# Patient Record
Sex: MALE | Race: WHITE | NOT HISPANIC OR LATINO | URBAN - METROPOLITAN AREA
[De-identification: names, ages, dates, MRNs, and addresses within clinical notes are randomized per-mention and may not be internally consistent; named-entity substitution may affect disease eponyms.]

---

## 2017-12-11 ENCOUNTER — OUTPATIENT (OUTPATIENT)
Dept: OUTPATIENT SERVICES | Facility: HOSPITAL | Age: 52
LOS: 1 days | Discharge: HOME | End: 2017-12-11

## 2017-12-11 DIAGNOSIS — I20.9 ANGINA PECTORIS, UNSPECIFIED: ICD-10-CM

## 2018-02-09 ENCOUNTER — INPATIENT (INPATIENT)
Facility: HOSPITAL | Age: 53
LOS: 3 days | Discharge: HOME | End: 2018-02-13
Attending: HOSPITALIST

## 2018-02-09 VITALS
HEART RATE: 101 BPM | WEIGHT: 220.02 LBS | SYSTOLIC BLOOD PRESSURE: 138 MMHG | DIASTOLIC BLOOD PRESSURE: 75 MMHG | HEIGHT: 70 IN | TEMPERATURE: 98 F | RESPIRATION RATE: 18 BRPM

## 2018-02-09 DIAGNOSIS — Z98.890 OTHER SPECIFIED POSTPROCEDURAL STATES: Chronic | ICD-10-CM

## 2018-02-09 LAB
ALBUMIN SERPL ELPH-MCNC: 4.2 G/DL — SIGNIFICANT CHANGE UP (ref 3–5.5)
ALP SERPL-CCNC: 38 U/L — SIGNIFICANT CHANGE UP (ref 30–115)
ALT FLD-CCNC: 29 U/L — SIGNIFICANT CHANGE UP (ref 0–41)
ANION GAP SERPL CALC-SCNC: 8 MMOL/L — SIGNIFICANT CHANGE UP (ref 7–14)
APTT BLD: 27.7 SEC — SIGNIFICANT CHANGE UP (ref 27–39.2)
AST SERPL-CCNC: 40 U/L — SIGNIFICANT CHANGE UP (ref 0–41)
BASOPHILS # BLD AUTO: 0.01 K/UL — SIGNIFICANT CHANGE UP (ref 0–0.2)
BASOPHILS NFR BLD AUTO: 0.1 % — SIGNIFICANT CHANGE UP (ref 0–1)
BILIRUB SERPL-MCNC: 0.7 MG/DL — SIGNIFICANT CHANGE UP (ref 0.2–1.2)
BUN SERPL-MCNC: 15 MG/DL — SIGNIFICANT CHANGE UP (ref 10–20)
CALCIUM SERPL-MCNC: 9 MG/DL — SIGNIFICANT CHANGE UP (ref 8.5–10.1)
CHLORIDE SERPL-SCNC: 101 MMOL/L — SIGNIFICANT CHANGE UP (ref 98–110)
CO2 SERPL-SCNC: 27 MMOL/L — SIGNIFICANT CHANGE UP (ref 17–32)
CREAT SERPL-MCNC: 0.9 MG/DL — SIGNIFICANT CHANGE UP (ref 0.7–1.5)
EOSINOPHIL # BLD AUTO: 0.03 K/UL — SIGNIFICANT CHANGE UP (ref 0–0.7)
EOSINOPHIL NFR BLD AUTO: 0.3 % — SIGNIFICANT CHANGE UP (ref 0–8)
ERYTHROCYTE [SEDIMENTATION RATE] IN BLOOD: 58 MM/HR — HIGH (ref 0–10)
GLUCOSE SERPL-MCNC: 95 MG/DL — SIGNIFICANT CHANGE UP (ref 70–110)
HCT VFR BLD CALC: 37.9 % — LOW (ref 42–52)
HGB BLD-MCNC: 13.3 G/DL — LOW (ref 14–18)
IMM GRANULOCYTES NFR BLD AUTO: 0.4 % — HIGH (ref 0.1–0.3)
INR BLD: 1.15 RATIO — SIGNIFICANT CHANGE UP (ref 0.65–1.3)
LACTATE SERPL-SCNC: 0.7 MMOL/L — SIGNIFICANT CHANGE UP (ref 0.5–2.2)
LYMPHOCYTES # BLD AUTO: 0.74 K/UL — LOW (ref 1.2–3.4)
LYMPHOCYTES # BLD AUTO: 7 % — LOW (ref 20.5–51.1)
MCHC RBC-ENTMCNC: 32.5 PG — HIGH (ref 27–31)
MCHC RBC-ENTMCNC: 35.1 G/DL — SIGNIFICANT CHANGE UP (ref 32–37)
MCV RBC AUTO: 92.7 FL — SIGNIFICANT CHANGE UP (ref 80–94)
MONOCYTES # BLD AUTO: 0.88 K/UL — HIGH (ref 0.1–0.6)
MONOCYTES NFR BLD AUTO: 8.4 % — SIGNIFICANT CHANGE UP (ref 1.7–9.3)
NEUTROPHILS # BLD AUTO: 8.8 K/UL — HIGH (ref 1.4–6.5)
NEUTROPHILS NFR BLD AUTO: 83.8 % — HIGH (ref 42.2–75.2)
PLATELET # BLD AUTO: 164 K/UL — SIGNIFICANT CHANGE UP (ref 130–400)
POTASSIUM SERPL-MCNC: 4.3 MMOL/L — SIGNIFICANT CHANGE UP (ref 3.5–5)
POTASSIUM SERPL-SCNC: 4.3 MMOL/L — SIGNIFICANT CHANGE UP (ref 3.5–5)
PROT SERPL-MCNC: 7.2 G/DL — SIGNIFICANT CHANGE UP (ref 6–8)
PROTHROM AB SERPL-ACNC: 12.5 SEC — SIGNIFICANT CHANGE UP (ref 9.95–12.87)
RBC # BLD: 4.09 M/UL — LOW (ref 4.7–6.1)
RBC # FLD: 11.7 % — SIGNIFICANT CHANGE UP (ref 11.5–14.5)
SODIUM SERPL-SCNC: 136 MMOL/L — SIGNIFICANT CHANGE UP (ref 135–146)
WBC # BLD: 10.5 K/UL — SIGNIFICANT CHANGE UP (ref 4.8–10.8)
WBC # FLD AUTO: 10.5 K/UL — SIGNIFICANT CHANGE UP (ref 4.8–10.8)

## 2018-02-09 RX ORDER — AMPICILLIN SODIUM AND SULBACTAM SODIUM 250; 125 MG/ML; MG/ML
3 INJECTION, POWDER, FOR SUSPENSION INTRAMUSCULAR; INTRAVENOUS ONCE
Qty: 0 | Refills: 0 | Status: COMPLETED | OUTPATIENT
Start: 2018-02-09 | End: 2018-02-09

## 2018-02-09 RX ADMIN — AMPICILLIN SODIUM AND SULBACTAM SODIUM 200 GRAM(S): 250; 125 INJECTION, POWDER, FOR SUSPENSION INTRAMUSCULAR; INTRAVENOUS at 20:15

## 2018-02-09 NOTE — ED PROVIDER NOTE - ATTENDING CONTRIBUTION TO CARE
52 y.o. M comes in c/o left knee pain, swelling, redness for 3 days, getting worse. Pt denies trauma. No fever/chills. Denies any bites/scratches or lesions over the knee. No hip/ankle pain. Pt was seen by PMD (Rober Quigley) who sent pt to the ED to r/o septic joint. On exam, pt in NAD, AAOx3, head NC/AT, CN II-XII intact, lungs CTA B/L, CV S1S2 regular, abdomen soft/NT/ND/(+)BS, ext (+) pain/warmth/swelling to left patella/knee, (+) pain on flexion/extension of the knee, Valgus/varus intact, anterior/posterior drawer sign (-), calf NTND. Will do labs, XR, Abx and reevaluate.

## 2018-02-09 NOTE — ED PROVIDER NOTE - PROGRESS NOTE DETAILS
d/w ortho resident, who saw xray, not concerned for septic joint at this point, will admit for IV antibiotics and place routine consult, ortho will see tomorrow morning.

## 2018-02-09 NOTE — ED PROVIDER NOTE - PHYSICAL EXAMINATION
AOx4, Non toxic appearing, NAD. Skin  warm and dry, no acute rash. PERRLA/EOM, conjunctiva and sclera clear. MM moist, no nasal discharge.  Pharynx and TM's unremarkable. Neck supple nt, no meningeal signs. Heart RRR s1s2 nl, no rub/murmur. Lungs- BS equal, CTAB. Abdomen soft ntnd no r/g. Extremities- moves all, +equal distal pulses, sensation wnl, normal ROM.     Left knee tender and erythematous with central lesion, decreased rom.

## 2018-02-09 NOTE — ED PROVIDER NOTE - OBJECTIVE STATEMENT
53 yo M p/w L knee pain and swelling x 3 days worsening, limited rom due to pain, began with mild abrasion to front of knee, denies fever or chills.

## 2018-02-09 NOTE — ED PROVIDER NOTE - NS ED ROS FT
Pt denied fvr/chills, HA, visual changes, dizziness, light headedness, presyncope, LOC, CP, POWERS, PND, SOB, cough, hemoptysis, abd pain, n/v/d, changes in bowel or bladder habits, numbness, weakness,  The pt also denied recent travel outside of the country, recent illnesses, sick contacts, recent airplane trips or periods of immobility/bedbound.

## 2018-02-10 DIAGNOSIS — L03.116 CELLULITIS OF LEFT LOWER LIMB: ICD-10-CM

## 2018-02-10 LAB — TYPE + AB SCN PNL BLD: SIGNIFICANT CHANGE UP

## 2018-02-10 RX ORDER — ENOXAPARIN SODIUM 100 MG/ML
40 INJECTION SUBCUTANEOUS EVERY 24 HOURS
Qty: 0 | Refills: 0 | Status: DISCONTINUED | OUTPATIENT
Start: 2018-02-10 | End: 2018-02-13

## 2018-02-10 RX ORDER — IBUPROFEN 200 MG
600 TABLET ORAL
Qty: 0 | Refills: 0 | Status: DISCONTINUED | OUTPATIENT
Start: 2018-02-10 | End: 2018-02-13

## 2018-02-10 RX ORDER — AMPICILLIN SODIUM AND SULBACTAM SODIUM 250; 125 MG/ML; MG/ML
3 INJECTION, POWDER, FOR SUSPENSION INTRAMUSCULAR; INTRAVENOUS ONCE
Qty: 0 | Refills: 0 | Status: COMPLETED | OUTPATIENT
Start: 2018-02-10 | End: 2018-02-10

## 2018-02-10 RX ORDER — OXYCODONE HYDROCHLORIDE 5 MG/1
5 TABLET ORAL EVERY 4 HOURS
Qty: 0 | Refills: 0 | Status: DISCONTINUED | OUTPATIENT
Start: 2018-02-10 | End: 2018-02-13

## 2018-02-10 RX ORDER — AMPICILLIN SODIUM AND SULBACTAM SODIUM 250; 125 MG/ML; MG/ML
3 INJECTION, POWDER, FOR SUSPENSION INTRAMUSCULAR; INTRAVENOUS EVERY 6 HOURS
Qty: 0 | Refills: 0 | Status: DISCONTINUED | OUTPATIENT
Start: 2018-02-10 | End: 2018-02-13

## 2018-02-10 RX ORDER — AMPICILLIN SODIUM AND SULBACTAM SODIUM 250; 125 MG/ML; MG/ML
INJECTION, POWDER, FOR SUSPENSION INTRAMUSCULAR; INTRAVENOUS
Qty: 0 | Refills: 0 | Status: DISCONTINUED | OUTPATIENT
Start: 2018-02-10 | End: 2018-02-13

## 2018-02-10 RX ADMIN — AMPICILLIN SODIUM AND SULBACTAM SODIUM 200 GRAM(S): 250; 125 INJECTION, POWDER, FOR SUSPENSION INTRAMUSCULAR; INTRAVENOUS at 17:06

## 2018-02-10 RX ADMIN — OXYCODONE HYDROCHLORIDE 5 MILLIGRAM(S): 5 TABLET ORAL at 17:50

## 2018-02-10 RX ADMIN — Medication 300 MILLIGRAM(S): at 17:07

## 2018-02-10 RX ADMIN — AMPICILLIN SODIUM AND SULBACTAM SODIUM 200 GRAM(S): 250; 125 INJECTION, POWDER, FOR SUSPENSION INTRAMUSCULAR; INTRAVENOUS at 12:27

## 2018-02-10 RX ADMIN — OXYCODONE HYDROCHLORIDE 5 MILLIGRAM(S): 5 TABLET ORAL at 16:58

## 2018-02-10 RX ADMIN — ENOXAPARIN SODIUM 40 MILLIGRAM(S): 100 INJECTION SUBCUTANEOUS at 21:08

## 2018-02-10 RX ADMIN — Medication 300 MILLIGRAM(S): at 14:55

## 2018-02-10 RX ADMIN — OXYCODONE HYDROCHLORIDE 5 MILLIGRAM(S): 5 TABLET ORAL at 09:40

## 2018-02-10 RX ADMIN — OXYCODONE HYDROCHLORIDE 5 MILLIGRAM(S): 5 TABLET ORAL at 10:20

## 2018-02-10 NOTE — CONSULT NOTE ADULT - ASSESSMENT
Full consult to follow Patient is a 52y old  Male who presents to the ER for evaluation of worsening left knee pain, redness and swelling. He does not recall any injury or trauma to the knee. He has no fever or chills. The Xray of Left knee shows no joint effusion but soft tissue swelling. He has evaluated by Ortho team, and started on Unasyn and Clindamycin. The ID consult requested to assist with further evaluation and antibiotic management.     # Left Knee Cellulitis    Would recommend:  1. Obtain Blood cultures X 2  2. Continue Unasyn  and  change Clindamycin to Vancomycin  3. Monitor and keep Vancomycin level between 15 to 20  4. Obtain HIV test  5. Continue pain management    d/w Patient    will follow the patient with you and make further recommendation based on the clinical course and Lab results  Thank you for the opportunity to participate in Mr. HOWELL's care

## 2018-02-10 NOTE — H&P ADULT - NSHPLABSRESULTS_GEN_ALL_CORE
CBC Full  -  ( 09 Feb 2018 20:54 )  WBC Count : 10.50 K/uL  Hemoglobin : 13.3 g/dL  Hematocrit : 37.9 %  Platelet Count - Automated : 164 K/uL  Mean Cell Volume : 92.7 fL  Mean Cell Hemoglobin : 32.5 pg  Mean Cell Hemoglobin Concentration : 35.1 g/dL  Auto Neutrophil # : 8.80 K/uL  Auto Lymphocyte # : 0.74 K/uL  Auto Monocyte # : 0.88 K/uL  Auto Eosinophil # : 0.03 K/uL  Auto Basophil # : 0.01 K/uL  Auto Neutrophil % : 83.8 %  Auto Lymphocyte % : 7.0 %  Auto Monocyte % : 8.4 %  Auto Eosinophil % : 0.3 %  Auto Basophil % : 0.1 %  02-09    136  |  101  |  15  ----------------------------<  95  4.3   |  27  |  0.9    Ca    9.0      09 Feb 2018 20:54    TPro  7.2  /  Alb  4.2  /  TBili  0.7  /  DBili  x   /  AST  40  /  ALT  29  /  AlkPhos  38  02-09

## 2018-02-10 NOTE — H&P ADULT - NSHPPHYSICALEXAM_GEN_ALL_CORE
unremarkable physsical except for redness with small ulceration over left knee. There is some limitation of motion to that joint

## 2018-02-10 NOTE — CONSULT NOTE ADULT - ASSESSMENT
Impression:  Cellulitis Left knee / probable infected left pre-patellar bursa / R/O left knee joint infection.

## 2018-02-10 NOTE — CONSULT NOTE ADULT - SUBJECTIVE AND OBJECTIVE BOX
Patient is a 52y old  Male who presents with a chief complaint of left knee problem (10 Feb 2018 04:29)      INTERVAL HPI/OVERNIGHT EVENTS:  T(C): 37.2 (02-10-18 @ 15:00), Max: 37.2 (02-10-18 @ 15:00)  HR: 86 (02-10-18 @ 15:00) (86 - 94)  BP: 120/60 (02-10-18 @ 15:00) (120/60 - 146/73)  RR: 16 (02-10-18 @ 15:00) (16 - 20)  SpO2: 98% (02-10-18 @ 02:02) (98% - 98%)  Wt(kg): --  I&O's Summary      PAST MEDICAL & SURGICAL HISTORY:  No pertinent past medical history  H/O shoulder surgery      SOCIAL HISTORY  Alcohol:  Tobacco:  Illicit substance use:      FAMILY HISTORY:      LABS:                        13.3   10.50 )-----------( 164      ( 09 Feb 2018 20:54 )             37.9     02-09    136  |  101  |  15  ----------------------------<  95  4.3   |  27  |  0.9    Ca    9.0      09 Feb 2018 20:54    TPro  7.2  /  Alb  4.2  /  TBili  0.7  /  DBili  x   /  AST  40  /  ALT  29  /  AlkPhos  38  02-09    PT/INR - ( 09 Feb 2018 20:54 )   PT: 12.50 sec;   INR: 1.15 ratio         PTT - ( 09 Feb 2018 20:54 )  PTT:27.7 sec    CAPILLARY BLOOD GLUCOSE                MEDICATIONS  (STANDING):  ampicillin/sulbactam  IVPB      ampicillin/sulbactam  IVPB 3 Gram(s) IV Intermittent every 6 hours  clindamycin   Capsule 300 milliGRAM(s) Oral four times a day  enoxaparin Injectable 40 milliGRAM(s) SubCutaneous every 24 hours    MEDICATIONS  (PRN):  ibuprofen  Tablet 600 milliGRAM(s) Oral four times a day PRN pain  oxyCODONE    IR 5 milliGRAM(s) Oral every 4 hours PRN Moderate Pain (4 - 6)      REVIEW OF SYSTEMS:  CONSTITUTIONAL: No fever, weight loss, or fatigue  EYES: No eye pain, visual disturbances, or discharge  ENMT:  No difficulty hearing, tinnitus, vertigo; No sinus or throat pain  NECK: No pain or stiffness  RESPIRATORY: No cough, wheezing, chills or hemoptysis; No shortness of breath  CARDIOVASCULAR: No chest pain, palpitations, dizziness, or leg swelling  GASTROINTESTINAL: No abdominal or epigastric pain. No nausea, vomiting, or hematemesis; No diarrhea or constipation. No melena or hematochezia.  GENITOURINARY: No dysuria, frequency, hematuria, or incontinence  NEUROLOGICAL: No headaches, memory loss, loss of strength, numbness, or tremors  SKIN: No itching, burning, rashes, or lesions   LYMPH NODES: No enlarged glands  ENDOCRINE: No heat or cold intolerance; No hair loss  MUSCULOSKELETAL: No joint pain or swelling; No muscle, back, or extremity pain  PSYCHIATRIC: No depression, anxiety, mood swings, or difficulty sleeping  HEME/LYMPH: No easy bruising, or bleeding gums  ALLERY AND IMMUNOLOGIC: No hives or eczema    RADIOLOGY & ADDITIONAL TESTS:    Imaging Personally Reviewed:  [ ] YES  [ ] NO    Consultant(s) Notes Reviewed:  [ ] YES  [ ] NO    PHYSICAL EXAM:  GENERAL: NAD, well-groomed, well-developed  HEAD:  Atraumatic, Normocephalic  EYES: EOMI, PERRLA, conjunctiva and sclera clear  ENMT: No tonsillar erythema, exudates, or enlargement; Moist mucous membranes, Good dentition, No lesions  NECK: Supple, No JVD, Normal thyroid  NERVOUS SYSTEM:  Alert & Oriented X3, Good concentration; Motor Strength 5/5 B/L upper and lower extremities; DTRs 2+ intact and symmetric  CHEST/LUNG: Clear to percussion bilaterally; No rales, rhonchi, wheezing, or rubs  HEART: Regular rate and rhythm; No murmurs, rubs, or gallops  ABDOMEN: Soft, Nontender, Nondistended; Bowel sounds present  EXTREMITIES:  2+ Peripheral Pulses, No clubbing, cyanosis, or edema  LYMPH: No lymphadenopathy noted  SKIN: No rashes or lesions    Care Discussed with Consultants/Other Providers [ ] YES  [ ] NO Patient is a 52y old  Male who presents to the ER for evaluation of worsening left knee pain, redness and swelling. He does not recall any injury or trauma to the knee. He has no fever or chills. The Xray of Left knee shows no joint effusion but soft tissue swelling. He has evaluated by Ortho team, and started on Unasyn and Clindamycin. The ID consult requested to assist with further evaluation and antibiotic management.           REVIEW OF SYSTEMS: Total of twelve systems have been reviewed with patient and found to be negative unless mentioned in HPI          PAST MEDICAL & SURGICAL HISTORY:  No pertinent past medical history  H/O shoulder surgery        SOCIAL HISTORY  Alcohol: Does not drink  Tobacco: does not smoke  Illicit substance use: None        FAMILY HISTORY: Non contributory to the present illness          ALLERGIES: NKDA        T(C): 37.2 (02-10-18 @ 15:00), Max: 37.2 (02-10-18 @ 15:00)  HR: 86 (02-10-18 @ 15:00) (86 - 94)  BP: 120/60 (02-10-18 @ 15:00) (120/60 - 146/73)  RR: 16 (02-10-18 @ 15:00) (16 - 20)  SpO2: 98% (02-10-18 @ 02:02) (98% - 98%)  Wt(kg): --  I&O's Summary        PHYSICAL EXAM:  GENERAL: Not in distress  CVS: s1 and s2 present  RESP: Air entry B/L  GI: abdomen soft and nontender  EXT: Left Knee red, swollen, warm to touch and tender  CNS: AAOX3          LABS:                        13.3   10.50 )-----------( 164      ( 09 Feb 2018 20:54 )             37.9         02-09    136  |  101  |  15  ----------------------------<  95  4.3   |  27  |  0.9    Ca    9.0      09 Feb 2018 20:54    TPro  7.2  /  Alb  4.2  /  TBili  0.7  /  DBili  x   /  AST  40  /  ALT  29  /  AlkPhos  38  02-09    PT/INR - ( 09 Feb 2018 20:54 )   PT: 12.50 sec;   INR: 1.15 ratio         PTT - ( 09 Feb 2018 20:54 )  PTT:27.7 sec    CAPILLARY BLOOD GLUCOSE              MEDICATIONS  (STANDING):  ampicillin/sulbactam  IVPB      ampicillin/sulbactam  IVPB 3 Gram(s) IV Intermittent every 6 hours  clindamycin   Capsule 300 milliGRAM(s) Oral four times a day  enoxaparin Injectable 40 milliGRAM(s) SubCutaneous every 24 hours    MEDICATIONS  (PRN):  ibuprofen  Tablet 600 milliGRAM(s) Oral four times a day PRN pain  oxyCODONE    IR 5 milliGRAM(s) Oral every 4 hours PRN Moderate Pain (4 - 6)          RADIOLOGY & ADDITIONAL TESTS:    < from: Xray Knee 1 or 2 Views, Left (02.09.18 @ 19:46) >  impression: Focal prepatellar soft tissue swelling is present with tendon   thickening with  chronically fragmented patellar enthesopathy,. Findings   are compatible with acute on chronic jumpers knee.    There are also rounded ossicles at the distal quadriceps compatible   chronic tendinosis with prior avulsion/enthesopathy.     No acute displaced fracture or joint malalignment present. There is mild   tricompartmental joint space narrowing with osteophytes. No suprapatellar   joint effusion present.            < end of copied text >

## 2018-02-10 NOTE — H&P ADULT - HISTORY OF PRESENT ILLNESS
52 y.o. male presents to the er due to pain swelling and redness to left knee for 2 days but worse since last night

## 2018-02-10 NOTE — CONSULT NOTE ADULT - PROBLEM SELECTOR RECOMMENDATION 9
* Case d/w Dr. Zavala and wants:    - MRI of left knee to r/o deep collection or intra-articular collection / septic knee. (Patient denies any metal or foreign objects in body).    - ID consult and F/U.  - Ivabx.  - Pain medications PRN.  - Once MRI left knee completed and resulted , will either I&D if superficial vs refer back to Orthopaedics if shows intra-articular collection/septic knee.  - Dr. Zavala to follow.

## 2018-02-10 NOTE — CONSULT NOTE ADULT - SUBJECTIVE AND OBJECTIVE BOX
male with redness on knee cap 3 days no fever or chills ,  improved on iv antibiotics    3x3 cm redness ,no abscess palpated, no knee effusion good rom stable    xray some calcification in quad and anterior to patella

## 2018-02-10 NOTE — CONSULT NOTE ADULT - SUBJECTIVE AND OBJECTIVE BOX
53 y/o male with c/o redness, swelling and moderate to severe pain on left knee cap for 3 days and becoming progressively worse so came to ER for evaluation.  Patient denies any fever or chills.  Patient able to ambulate and bend his knee but with significant pain. Patient denies injury to his knee but noted a small lesion on anterior knee that may have caused this problem.  Patient reports improved on iv antibiotics since in hospital.      PAST MEDICAL & SURGICAL HISTORY:  No pertinent past medical history  H/O shoulder surgery      Allergies:  No Known Allergies or   Intolerances    Medications:  Denies using medications.    Social hx.:  Smokes cigars, denies Etoh / Drug use.      Vital Signs Last 24 Hrs  T(C): 36.9 (10 Feb 2018 07:50), Max: 36.9 (10 Feb 2018 07:50)  T(F): 98.4 (10 Feb 2018 07:50), Max: 98.4 (10 Feb 2018 07:50)  HR: 94 (10 Feb 2018 07:50) (88 - 101)  BP: 125/59 (10 Feb 2018 07:50) (123/72 - 146/73)  BP(mean): --  RR: 16 (10 Feb 2018 07:50) (16 - 20)  SpO2: 98% (10 Feb 2018 02:02) (98% - 98%)        PHYSICAL EXAM:    Constitutional: A&Ox4    Eyes:PERRLA, EOM intact    Respiratory:cta b/l    Cardiovascular: s1 s2 rrr    Gastrointestinal: soft nt  nd + bs no rebound or guarding    Genitourinary:no cva tenderenss    Extremities:   decreased ROM limited by pain and swelling, + 3 x 3 area of edema,  warmth, induration erythema to anterior knee, pre-patella area,  tenderness to palpation, no palpable knee joint effusion, +5/+5 foot/ankle DF and PF, + 2/+2 pedal pulses, sensation intact distally, no palpable cords, absent Wilber's sign.            LABS:                13.3   10.50 )-----------( 164      ( 09 Feb 2018 20:54 )             37.9         02-09    136  |  101  |  15  ----------------------------<  95  4.3   |  27  |  0.9    Ca    9.0      09 Feb 2018 20:54    TPro  7.2  /  Alb  4.2  /  TBili  0.7  /  DBili  x   /  AST  40  /  ALT  29  /  AlkPhos  38  02-09        XRAY Left knee:      Findings/  impression: Focal prepatellar soft tissue swelling is present with tendon   thickening with  chronically fragmented patellar enthesopathy,. Findings   are compatible with acute on chronic jumpers knee.    There are also rounded ossicles at the distal quadriceps compatible   chronic tendinosis with prior avulsion/enthesopathy.     No acute displaced fracture or joint malalignment present. There is mild   tricompartmental joint space narrowing with osteophytes. No suprapatellar   joint effusion present.

## 2018-02-11 LAB
ALBUMIN SERPL ELPH-MCNC: 3.4 G/DL — SIGNIFICANT CHANGE UP (ref 3–5.5)
ALP SERPL-CCNC: 32 U/L — SIGNIFICANT CHANGE UP (ref 30–115)
ALT FLD-CCNC: 26 U/L — SIGNIFICANT CHANGE UP (ref 0–41)
ANION GAP SERPL CALC-SCNC: 7 MMOL/L — SIGNIFICANT CHANGE UP (ref 7–14)
AST SERPL-CCNC: 26 U/L — SIGNIFICANT CHANGE UP (ref 0–41)
BILIRUB SERPL-MCNC: 0.6 MG/DL — SIGNIFICANT CHANGE UP (ref 0.2–1.2)
BUN SERPL-MCNC: 17 MG/DL — SIGNIFICANT CHANGE UP (ref 10–20)
CALCIUM SERPL-MCNC: 8.7 MG/DL — SIGNIFICANT CHANGE UP (ref 8.5–10.1)
CHLORIDE SERPL-SCNC: 104 MMOL/L — SIGNIFICANT CHANGE UP (ref 98–110)
CO2 SERPL-SCNC: 26 MMOL/L — SIGNIFICANT CHANGE UP (ref 17–32)
CREAT SERPL-MCNC: 0.7 MG/DL — SIGNIFICANT CHANGE UP (ref 0.7–1.5)
CRP SERPL-MCNC: 3.6 MG/DL — HIGH (ref 0–0.4)
GLUCOSE SERPL-MCNC: 104 MG/DL — SIGNIFICANT CHANGE UP (ref 70–110)
HCT VFR BLD CALC: 34.8 % — LOW (ref 42–52)
HGB BLD-MCNC: 12.3 G/DL — LOW (ref 14–18)
MAGNESIUM SERPL-MCNC: 2.2 MG/DL — SIGNIFICANT CHANGE UP (ref 1.8–2.4)
MCHC RBC-ENTMCNC: 32.7 PG — HIGH (ref 27–31)
MCHC RBC-ENTMCNC: 35.3 G/DL — SIGNIFICANT CHANGE UP (ref 32–37)
MCV RBC AUTO: 92.6 FL — SIGNIFICANT CHANGE UP (ref 80–94)
NRBC # BLD: 0 /100 WBCS — SIGNIFICANT CHANGE UP (ref 0–0)
PLATELET # BLD AUTO: 159 K/UL — SIGNIFICANT CHANGE UP (ref 130–400)
POTASSIUM SERPL-MCNC: 4.6 MMOL/L — SIGNIFICANT CHANGE UP (ref 3.5–5)
POTASSIUM SERPL-SCNC: 4.6 MMOL/L — SIGNIFICANT CHANGE UP (ref 3.5–5)
PROT SERPL-MCNC: 6.1 G/DL — SIGNIFICANT CHANGE UP (ref 6–8)
RBC # BLD: 3.76 M/UL — LOW (ref 4.7–6.1)
RBC # FLD: 11.7 % — SIGNIFICANT CHANGE UP (ref 11.5–14.5)
SODIUM SERPL-SCNC: 137 MMOL/L — SIGNIFICANT CHANGE UP (ref 135–146)
WBC # BLD: 5.95 K/UL — SIGNIFICANT CHANGE UP (ref 4.8–10.8)
WBC # FLD AUTO: 5.95 K/UL — SIGNIFICANT CHANGE UP (ref 4.8–10.8)

## 2018-02-11 RX ORDER — VANCOMYCIN HCL 1 G
1250 VIAL (EA) INTRAVENOUS ONCE
Qty: 0 | Refills: 0 | Status: COMPLETED | OUTPATIENT
Start: 2018-02-11 | End: 2018-02-11

## 2018-02-11 RX ORDER — VANCOMYCIN HCL 1 G
1250 VIAL (EA) INTRAVENOUS EVERY 12 HOURS
Qty: 0 | Refills: 0 | Status: DISCONTINUED | OUTPATIENT
Start: 2018-02-11 | End: 2018-02-13

## 2018-02-11 RX ORDER — PANTOPRAZOLE SODIUM 20 MG/1
40 TABLET, DELAYED RELEASE ORAL
Qty: 0 | Refills: 0 | Status: DISCONTINUED | OUTPATIENT
Start: 2018-02-11 | End: 2018-02-13

## 2018-02-11 RX ORDER — VANCOMYCIN HCL 1 G
VIAL (EA) INTRAVENOUS
Qty: 0 | Refills: 0 | Status: DISCONTINUED | OUTPATIENT
Start: 2018-02-11 | End: 2018-02-13

## 2018-02-11 RX ORDER — ONDANSETRON 8 MG/1
4 TABLET, FILM COATED ORAL ONCE
Qty: 0 | Refills: 0 | Status: COMPLETED | OUTPATIENT
Start: 2018-02-11 | End: 2018-02-11

## 2018-02-11 RX ADMIN — ONDANSETRON 4 MILLIGRAM(S): 8 TABLET, FILM COATED ORAL at 15:53

## 2018-02-11 RX ADMIN — Medication 300 MILLIGRAM(S): at 05:15

## 2018-02-11 RX ADMIN — AMPICILLIN SODIUM AND SULBACTAM SODIUM 200 GRAM(S): 250; 125 INJECTION, POWDER, FOR SUSPENSION INTRAMUSCULAR; INTRAVENOUS at 23:06

## 2018-02-11 RX ADMIN — Medication 166.67 MILLIGRAM(S): at 17:41

## 2018-02-11 RX ADMIN — AMPICILLIN SODIUM AND SULBACTAM SODIUM 200 GRAM(S): 250; 125 INJECTION, POWDER, FOR SUSPENSION INTRAMUSCULAR; INTRAVENOUS at 05:19

## 2018-02-11 RX ADMIN — AMPICILLIN SODIUM AND SULBACTAM SODIUM 200 GRAM(S): 250; 125 INJECTION, POWDER, FOR SUSPENSION INTRAMUSCULAR; INTRAVENOUS at 05:14

## 2018-02-11 RX ADMIN — AMPICILLIN SODIUM AND SULBACTAM SODIUM 200 GRAM(S): 250; 125 INJECTION, POWDER, FOR SUSPENSION INTRAMUSCULAR; INTRAVENOUS at 17:41

## 2018-02-11 RX ADMIN — Medication 300 MILLIGRAM(S): at 05:18

## 2018-02-11 RX ADMIN — ENOXAPARIN SODIUM 40 MILLIGRAM(S): 100 INJECTION SUBCUTANEOUS at 21:16

## 2018-02-11 NOTE — PROGRESS NOTE ADULT - PROBLEM SELECTOR PLAN 1
continue with antibioitics, appreciate surgery and id eval, mri today, further plan pending mri results

## 2018-02-11 NOTE — CHART NOTE - NSCHARTNOTEFT_GEN_A_CORE
Patient seen and examined.  Reports feels well, pain and swelling improving with abx.,  denies fever, chills, tremors.  Patient had his MRI left knee today.      ICU Vital Signs Last 24 Hrs  T(C): 36.2 (11 Feb 2018 07:43), Max: 37 (10 Feb 2018 23:32)  T(F): 97.1 (11 Feb 2018 07:43), Max: 98.6 (10 Feb 2018 23:32)  HR: 77 (11 Feb 2018 07:43) (77 - 79)  BP: 116/61 (11 Feb 2018 07:43) (116/61 - 134/63)  BP(mean): --  ABP: --  ABP(mean): --  RR: 16 (11 Feb 2018 07:43) (16 - 16)  SpO2: --        Physical exam:      General: conversant in NAD.  LLE:  + edema and induration to left anterior knee, but appears slightly improved since yesterday.  TTP to anterior knee, decreased ROM left knee secondary to pain and edema.  +foot/ankle DF/PF, + pedal pulses,  sensation wnl's, no palpable cords, absent Wilber's sign.       Labs:                     12.3   5.95  )-----------( 159      ( 11 Feb 2018 07:08 )             34.8     02-11    137  |  104  |  17  ----------------------------<  104  4.6   |  26  |  0.7    Ca    8.7      11 Feb 2018 07:08  Mg     2.2     02-11    TPro  6.1  /  Alb  3.4  /  TBili  0.6  /  DBili  x   /  AST  26  /  ALT  26  /  AlkPhos  32  02-11        MRI Left knee:      Impression:  1. Prepatellar adventitial bursitis with surrounding inflammation  2. 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 Subacute on chronic quadriceps insertional partial tear, high-grade   involving the deep and medial fibers  4. Origin patellar tendon subacute on chronic non-retracted tear involving   less than 50% of fibers  5. Medial meniscal flap tear from body to posterior root  6. Moderate medial compartment osteoarthritis with stress related edema   at periphery of medial tibial plateau  7. Patellar apical median ridge high-grade focal chondral defect with   patchy subchondral edema and degenerative change          Imp:     Left knee Pre-patellar bursitis / cellulitis.    Plan:     - Ivabx. --> Unasyn & Vanco. / ID follow-up.  - Pain medications PRN.  - Dr. Zavala to follow--> will discuss MRI findings with Dr. Zavala and he will decide if needs aspiration or I&D of left knee bursa.

## 2018-02-12 DIAGNOSIS — S83.8X9A SPRAIN OF OTHER SPECIFIED PARTS OF UNSPECIFIED KNEE, INITIAL ENCOUNTER: ICD-10-CM

## 2018-02-12 DIAGNOSIS — M19.90 UNSPECIFIED OSTEOARTHRITIS, UNSPECIFIED SITE: ICD-10-CM

## 2018-02-12 DIAGNOSIS — M70.51 OTHER BURSITIS OF KNEE, RIGHT KNEE: ICD-10-CM

## 2018-02-12 LAB
ANION GAP SERPL CALC-SCNC: 6 MMOL/L — LOW (ref 7–14)
BUN SERPL-MCNC: 12 MG/DL — SIGNIFICANT CHANGE UP (ref 10–20)
CALCIUM SERPL-MCNC: 8.7 MG/DL — SIGNIFICANT CHANGE UP (ref 8.5–10.1)
CHLORIDE SERPL-SCNC: 101 MMOL/L — SIGNIFICANT CHANGE UP (ref 98–110)
CO2 SERPL-SCNC: 31 MMOL/L — SIGNIFICANT CHANGE UP (ref 17–32)
CREAT SERPL-MCNC: 0.7 MG/DL — SIGNIFICANT CHANGE UP (ref 0.7–1.5)
GLUCOSE SERPL-MCNC: 125 MG/DL — HIGH (ref 70–110)
HCT VFR BLD CALC: 37 % — LOW (ref 42–52)
HGB BLD-MCNC: 12.8 G/DL — LOW (ref 14–18)
MCHC RBC-ENTMCNC: 32.5 PG — HIGH (ref 27–31)
MCHC RBC-ENTMCNC: 34.6 G/DL — SIGNIFICANT CHANGE UP (ref 32–37)
MCV RBC AUTO: 93.9 FL — SIGNIFICANT CHANGE UP (ref 80–94)
NRBC # BLD: 0 /100 WBCS — SIGNIFICANT CHANGE UP (ref 0–0)
PLATELET # BLD AUTO: 175 K/UL — SIGNIFICANT CHANGE UP (ref 130–400)
POTASSIUM SERPL-MCNC: 4.1 MMOL/L — SIGNIFICANT CHANGE UP (ref 3.5–5)
POTASSIUM SERPL-SCNC: 4.1 MMOL/L — SIGNIFICANT CHANGE UP (ref 3.5–5)
RBC # BLD: 3.94 M/UL — LOW (ref 4.7–6.1)
RBC # FLD: 11.5 % — SIGNIFICANT CHANGE UP (ref 11.5–14.5)
SODIUM SERPL-SCNC: 138 MMOL/L — SIGNIFICANT CHANGE UP (ref 135–146)
WBC # BLD: 4.29 K/UL — LOW (ref 4.8–10.8)
WBC # FLD AUTO: 4.29 K/UL — LOW (ref 4.8–10.8)

## 2018-02-12 RX ADMIN — Medication 166.67 MILLIGRAM(S): at 05:37

## 2018-02-12 RX ADMIN — AMPICILLIN SODIUM AND SULBACTAM SODIUM 200 GRAM(S): 250; 125 INJECTION, POWDER, FOR SUSPENSION INTRAMUSCULAR; INTRAVENOUS at 05:37

## 2018-02-12 RX ADMIN — AMPICILLIN SODIUM AND SULBACTAM SODIUM 200 GRAM(S): 250; 125 INJECTION, POWDER, FOR SUSPENSION INTRAMUSCULAR; INTRAVENOUS at 11:24

## 2018-02-12 RX ADMIN — Medication 166.67 MILLIGRAM(S): at 18:33

## 2018-02-12 RX ADMIN — ENOXAPARIN SODIUM 40 MILLIGRAM(S): 100 INJECTION SUBCUTANEOUS at 21:39

## 2018-02-12 RX ADMIN — PANTOPRAZOLE SODIUM 40 MILLIGRAM(S): 20 TABLET, DELAYED RELEASE ORAL at 11:23

## 2018-02-12 RX ADMIN — AMPICILLIN SODIUM AND SULBACTAM SODIUM 200 GRAM(S): 250; 125 INJECTION, POWDER, FOR SUSPENSION INTRAMUSCULAR; INTRAVENOUS at 18:33

## 2018-02-12 NOTE — PROGRESS NOTE ADULT - ASSESSMENT
52 yr old male with left knee pain/swelling - cellulitis/bursitis, improving    1. bedside wound culture obtained by Dr. Zavala - f/u wound culture; continue 1/4 inch packing x 24 hrs then F/U 52 yr old male with left knee pain/swelling - cellulitis/bursitis, improving    1. bedside wound culture obtained by Dr. Zavala - f/u wound culture; continue 1/4 inch packing x 24 hrs then F/U in office on THursday 2/15/18

## 2018-02-12 NOTE — CONSULT NOTE ADULT - SUBJECTIVE AND OBJECTIVE BOX
50 y/o w m  ret nypd  active in exercise  with onset of pain swelling left knee   may have scratched  it  hx  multiple shoulder operations   nkda  meds: oxycodone  was I and D 'ed today  packed feeling better    PE   good rom  no extensor lag     N/V intact     afebrile    WBC  10.5      mild erythema  calf soft   neg Homans sign    has had MRI knee in past   with "damage"    xrays   calcifications in patella and quad tendons  MRI  knee   chronic changes to extensor mechanism   medical meniscus tear   prepatellar bursitis    Imp: prepatellar bursitis   I and D'ed   antibiotics   local care           No orthopedic intervention    ok to D/C from ortho standpoint  WBAT

## 2018-02-13 ENCOUNTER — TRANSCRIPTION ENCOUNTER (OUTPATIENT)
Age: 53
End: 2018-02-13

## 2018-02-13 VITALS
DIASTOLIC BLOOD PRESSURE: 60 MMHG | HEART RATE: 87 BPM | RESPIRATION RATE: 16 BRPM | SYSTOLIC BLOOD PRESSURE: 120 MMHG | TEMPERATURE: 97 F

## 2018-02-13 DIAGNOSIS — M70.52 OTHER BURSITIS OF KNEE, LEFT KNEE: ICD-10-CM

## 2018-02-13 LAB
ALBUMIN SERPL ELPH-MCNC: 3.1 G/DL — SIGNIFICANT CHANGE UP (ref 3–5.5)
ALP SERPL-CCNC: 29 U/L — LOW (ref 30–115)
ALT FLD-CCNC: 26 U/L — SIGNIFICANT CHANGE UP (ref 0–41)
ANION GAP SERPL CALC-SCNC: 4 MMOL/L — LOW (ref 7–14)
AST SERPL-CCNC: 28 U/L — SIGNIFICANT CHANGE UP (ref 0–41)
BILIRUB SERPL-MCNC: 0.6 MG/DL — SIGNIFICANT CHANGE UP (ref 0.2–1.2)
BUN SERPL-MCNC: 12 MG/DL — SIGNIFICANT CHANGE UP (ref 10–20)
CALCIUM SERPL-MCNC: 8.5 MG/DL — SIGNIFICANT CHANGE UP (ref 8.5–10.1)
CHLORIDE SERPL-SCNC: 103 MMOL/L — SIGNIFICANT CHANGE UP (ref 98–110)
CO2 SERPL-SCNC: 30 MMOL/L — SIGNIFICANT CHANGE UP (ref 17–32)
CREAT SERPL-MCNC: 0.7 MG/DL — SIGNIFICANT CHANGE UP (ref 0.7–1.5)
GLUCOSE SERPL-MCNC: 122 MG/DL — HIGH (ref 70–110)
HCT VFR BLD CALC: 34.8 % — LOW (ref 42–52)
HGB BLD-MCNC: 12.2 G/DL — LOW (ref 14–18)
MAGNESIUM SERPL-MCNC: 2.1 MG/DL — SIGNIFICANT CHANGE UP (ref 1.8–2.4)
MCHC RBC-ENTMCNC: 32.8 PG — HIGH (ref 27–31)
MCHC RBC-ENTMCNC: 35.1 G/DL — SIGNIFICANT CHANGE UP (ref 32–37)
MCV RBC AUTO: 93.5 FL — SIGNIFICANT CHANGE UP (ref 80–94)
NRBC # BLD: 0 /100 WBCS — SIGNIFICANT CHANGE UP (ref 0–0)
PLATELET # BLD AUTO: 171 K/UL — SIGNIFICANT CHANGE UP (ref 130–400)
POTASSIUM SERPL-MCNC: 3.9 MMOL/L — SIGNIFICANT CHANGE UP (ref 3.5–5)
POTASSIUM SERPL-SCNC: 3.9 MMOL/L — SIGNIFICANT CHANGE UP (ref 3.5–5)
PROT SERPL-MCNC: 5.9 G/DL — LOW (ref 6–8)
RBC # BLD: 3.72 M/UL — LOW (ref 4.7–6.1)
RBC # FLD: 11.2 % — LOW (ref 11.5–14.5)
SODIUM SERPL-SCNC: 137 MMOL/L — SIGNIFICANT CHANGE UP (ref 135–146)
WBC # BLD: 2.98 K/UL — LOW (ref 4.8–10.8)
WBC # FLD AUTO: 2.98 K/UL — LOW (ref 4.8–10.8)

## 2018-02-13 RX ORDER — LACTOBACILLUS ACIDOPHILUS 100MM CELL
1 CAPSULE ORAL
Qty: 39 | Refills: 0 | OUTPATIENT
Start: 2018-02-13 | End: 2018-02-25

## 2018-02-13 RX ORDER — CEPHALEXIN 500 MG
750 CAPSULE ORAL
Qty: 30 | Refills: 0 | OUTPATIENT
Start: 2018-02-13 | End: 2018-02-22

## 2018-02-13 RX ORDER — OXYCODONE HYDROCHLORIDE 5 MG/1
0 TABLET ORAL
Qty: 0 | Refills: 0 | COMMUNITY

## 2018-02-13 RX ORDER — IBUPROFEN 200 MG
1 TABLET ORAL
Qty: 0 | Refills: 0 | DISCHARGE
Start: 2018-02-13

## 2018-02-13 RX ORDER — LACTOBACILLUS ACIDOPHILUS 100MM CELL
1 CAPSULE ORAL
Qty: 39 | Refills: 0
Start: 2018-02-13 | End: 2018-02-25

## 2018-02-13 RX ORDER — CEPHALEXIN 500 MG
750 CAPSULE ORAL
Qty: 30 | Refills: 0
Start: 2018-02-13 | End: 2018-02-22

## 2018-02-13 RX ADMIN — AMPICILLIN SODIUM AND SULBACTAM SODIUM 200 GRAM(S): 250; 125 INJECTION, POWDER, FOR SUSPENSION INTRAMUSCULAR; INTRAVENOUS at 05:20

## 2018-02-13 RX ADMIN — Medication 166.67 MILLIGRAM(S): at 05:58

## 2018-02-13 RX ADMIN — PANTOPRAZOLE SODIUM 40 MILLIGRAM(S): 20 TABLET, DELAYED RELEASE ORAL at 06:36

## 2018-02-13 RX ADMIN — AMPICILLIN SODIUM AND SULBACTAM SODIUM 200 GRAM(S): 250; 125 INJECTION, POWDER, FOR SUSPENSION INTRAMUSCULAR; INTRAVENOUS at 00:46

## 2018-02-13 NOTE — DISCHARGE NOTE ADULT - MEDICATION SUMMARY - MEDICATIONS TO TAKE
I will START or STAY ON the medications listed below when I get home from the hospital:    ibuprofen 600 mg oral tablet  -- 1 tab(s) by mouth 4 times a day, As needed, pain  -- Indication: For Pain    cephalexin 750 mg oral capsule  -- 750 cap(s) by mouth every 8 hours   -- Finish all this medication unless otherwise directed by prescriber.    -- Indication: For Cellulitis of left lower extremity    Acidophilus oral capsule  -- 1 cap(s) by mouth 3 times a day   -- Indication: For CELLULITIS

## 2018-02-13 NOTE — DISCHARGE NOTE ADULT - CARE PLAN
Principal Discharge DX:	Cellulitis of left lower extremity  Goal:	resolution of symptoms  Assessment and plan of treatment:	c/w PO ABx to complete the course as per ID  Secondary Diagnosis:	Meniscal injury  Goal:	avoid trauma  Assessment and plan of treatment:	f/u with orthopedic Sx after d/c  Secondary Diagnosis:	Patellar bursitis of left knee  Goal:	resolution of simptoms  Assessment and plan of treatment:	c/w ABx ; f/u with surgery Dr Ngo

## 2018-02-13 NOTE — DISCHARGE NOTE ADULT - PATIENT PORTAL LINK FT
You can access the [facility], offered by [facility], by registering with the following website: [email or web address]

## 2018-02-13 NOTE — PROGRESS NOTE ADULT - PROBLEM SELECTOR PROBLEM 1
Cellulitis of left lower extremity
Cellulitis of left lower extremity
Patellar bursitis of right knee
Patellar bursitis of left knee

## 2018-02-13 NOTE — PROGRESS NOTE ADULT - SUBJECTIVE AND OBJECTIVE BOX
ANDREEA HOWELL  52y  [facility] [address] [place] [address]      Patient is a 52y old  Male who presents with a chief complaint of left knee problem (10 Feb 2018 04:29)      INTERVAL HPI/OVERNIGHT EVENTS:  no events overnight      REVIEW OF SYSTEMS:  CONSTITUTIONAL: No fever, weight loss, or fatigue  EYES: No eye pain, visual disturbances, or discharge  ENMT:  No difficulty hearing, tinnitus, vertigo; No sinus or throat pain  NECK: No pain or stiffness  BREASTS: No pain, masses, or nipple discharge  RESPIRATORY: No cough, wheezing, chills or hemoptysis; No shortness of breath  CARDIOVASCULAR: No chest pain, palpitations, dizziness, or leg swelling  GASTROINTESTINAL: No abdominal or epigastric pain. No nausea, vomiting, or hematemesis; No diarrhea or constipation. No melena or hematochezia.  GENITOURINARY: No dysuria, frequency, hematuria, or incontinence  NEUROLOGICAL: No headaches, memory loss, loss of strength, numbness, or tremors  SKIN: left knee erythema , nontender, no fluctuant mass appreciated   LYMPH NODES: No enlarged glands  ENDOCRINE: No heat or cold intolerance; No hair loss  MUSCULOSKELETAL: No joint pain or swelling; Full range of motion in all extremities  PSYCHIATRIC: No depression, anxiety, mood swings, or difficulty sleeping  HEME/LYMPH: No easy bruising, or bleeding gums  ALLERY AND IMMUNOLOGIC: No hives or eczema  FAMILY HISTORY:  No pertinent family history in first degree relatives    T(C): 36.2 (02-11-18 @ 07:43), Max: 37.2 (02-10-18 @ 15:00)  HR: 77 (02-11-18 @ 07:43) (77 - 86)  BP: 116/61 (02-11-18 @ 07:43) (116/61 - 134/63)  RR: 16 (02-11-18 @ 07:43) (16 - 16)  SpO2: --  Wt(kg): --Vital Signs Last 24 Hrs  T(C): 36.2 (11 Feb 2018 07:43), Max: 37.2 (10 Feb 2018 15:00)  T(F): 97.1 (11 Feb 2018 07:43), Max: 98.9 (10 Feb 2018 15:00)  HR: 77 (11 Feb 2018 07:43) (77 - 86)  BP: 116/61 (11 Feb 2018 07:43) (116/61 - 134/63)  BP(mean): --  RR: 16 (11 Feb 2018 07:43) (16 - 16)  SpO2: --    PHYSICAL EXAM:  GENERAL: NAD, well-groomed, well-developed  HEAD:  Atraumatic, Normocephalic  EYES: EOMI, PERRLA, conjunctiva and sclera clear  ENMT: No tonsillar erythema, exudates, or enlargement; Moist mucous membranes, Good dentition, No lesions  NECK: Supple, No JVD, Normal thyroid  NERVOUS SYSTEM:  Alert & Oriented X3, Good concentration; Motor Strength 5/5 B/L upper and lower extremities; DTRs 2+ intact and symmetric  CHEST/LUNG: Clear to percussion bilaterally; No rales, rhonchi, wheezing, or rubs  HEART: Regular rate and rhythm; No murmurs, rubs, or gallops  ABDOMEN: Soft, Nontender, Nondistended; Bowel sounds present  EXTREMITIES:  2+ Peripheral Pulses, full rom, left lower extrmeity erythema , no masses appreciated  LYMPH: No lymphadenopathy noted  SKIN: No rashes or lesions    Consultant(s) Notes Reviewed:  [x ] YES  [ ] NO  Care Discussed with Consultants/Other Providers [ x] YES  [ ] NO    LABS:                            12.3   5.95  )-----------( 159      ( 11 Feb 2018 07:08 )             34.8   02-09    136  |  101  |  15  ----------------------------<  95  4.3   |  27  |  0.9    Ca    9.0      09 Feb 2018 20:54  Mg     2.2     02-11    TPro  7.2  /  Alb  4.2  /  TBili  0.7  /  DBili  x   /  AST  40  /  ALT  29  /  AlkPhos  38  02-09            ampicillin/sulbactam  IVPB      ampicillin/sulbactam  IVPB 3 Gram(s) IV Intermittent every 6 hours  enoxaparin Injectable 40 milliGRAM(s) SubCutaneous every 24 hours  ibuprofen  Tablet 600 milliGRAM(s) Oral four times a day PRN  oxyCODONE    IR 5 milliGRAM(s) Oral every 4 hours PRN  vancomycin  IVPB      vancomycin  IVPB 1250 milliGRAM(s) IV Intermittent once  vancomycin  IVPB 1250 milliGRAM(s) IV Intermittent every 12 hours      HEALTH ISSUES - PROBLEM Dx:  Cellulitis of left lower extremity: Cellulitis of left lower extremity          Case Discussed with House Staff   Time Spent 40 minutes
DIAGNOSIS: left knee infection  HOSPITAL DAY #: 2    STATUS POST:  n/a  POST OPERATIVE DAY #: n/a     Vital Signs Last 24 Hrs  T(C): 36.5 (12 Feb 2018 16:02), Max: 37.7 (11 Feb 2018 23:43)  T(F): 97.7 (12 Feb 2018 16:02), Max: 99.8 (11 Feb 2018 23:43)  HR: 85 (12 Feb 2018 16:02) (80 - 85)  BP: 149/86 (12 Feb 2018 16:02) (112/64 - 149/86)  BP(mean): --  RR: 16 (12 Feb 2018 16:02) (16 - 16)  SpO2: --    MEDS: MEDICATIONS  (STANDING):  ampicillin/sulbactam  IVPB 3 Gram(s) IV Intermittent every 6 hours  ampicillin/sulbactam  IVPB      enoxaparin Injectable 40 milliGRAM(s) SubCutaneous every 24 hours  pantoprazole    Tablet 40 milliGRAM(s) Oral before breakfast  vancomycin  IVPB      vancomycin  IVPB 1250 milliGRAM(s) IV Intermittent every 12 hours  	  SUBJECTIVE: Pt seen, states his left knee pain and swelling is improved    Pain: YES  [x ]   NO [ ]         LABS:                        12.8   4.29  )-----------( 175      ( 12 Feb 2018 07:10 )             37.0     02-12    138  |  101  |  12  ----------------------------<  125<H>  4.1   |  31  |  0.7    Ca    8.7      12 Feb 2018 07:10  Mg     2.2     02-11    TPro  6.1  /  Alb  3.4  /  TBili  0.6  /  DBili  x   /  AST  26  /  ALT  26  /  AlkPhos  32  02-11      RADIOLOGY:       MR Knee No Cont, Left (02.11.18 @ 12:55) >  Impression:  1. Prepatellar adventitial bursitis with surrounding inflammation  2. 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 Subacute on chronic quadriceps insertional partial tear, high-grade   involving the deep and medial fibers  4. Origin patellar tendon subacute on chronic nonretracted tear involving   less than 50% of fibers  5. Medial meniscal flap tear from body to posterior root  6. Moderate medial compartment osteoarthritis with stress related edema   at periphery of medial tibial plateau  7. Patellar apical median ridge high-grade focal chondral defect with   patchy subchondral edema and degenerative change
Patient is a 52y old  Male who presents with a chief complaint of left knee problem (10 Feb 2018 04:29)      INTERVAL HPI/OVERNIGHT EVENTS:  no events overnight    Vital Signs Last 24 Hrs  T(C): 36.2 (12 Feb 2018 07:30), Max: 37.7 (11 Feb 2018 23:43)  T(F): 97.2 (12 Feb 2018 07:30), Max: 99.8 (11 Feb 2018 23:43)  HR: 80 (12 Feb 2018 07:30) (80 - 90)  BP: 112/64 (12 Feb 2018 07:30) (112/64 - 159/79)  BP(mean): --  RR: 16 (12 Feb 2018 07:30) (16 - 16)  SpO2: --    REVIEW OF SYSTEMS:  CONSTITUTIONAL: No fever, weight loss, or fatigue  EYES: No eye pain, visual disturbances, or discharge  ENMT:  No difficulty hearing, tinnitus, vertigo; No sinus or throat pain  NECK: No pain or stiffness  BREASTS: No pain, masses, or nipple discharge  RESPIRATORY: No cough, wheezing, chills or hemoptysis; No shortness of breath  CARDIOVASCULAR: No chest pain, palpitations, dizziness, or leg swelling  GASTROINTESTINAL: No abdominal or epigastric pain. No nausea, vomiting, or hematemesis; No diarrhea or constipation. No melena or hematochezia.  GENITOURINARY: No dysuria, frequency, hematuria, or incontinence  NEUROLOGICAL: No headaches, memory loss, loss of strength, numbness, or tremors  SKIN: left knee erythema , nontender, no fluctuant mass appreciated   LYMPH NODES: No enlarged glands  ENDOCRINE: No heat or cold intolerance; No hair loss  MUSCULOSKELETAL: No joint pain or swelling; Full range of motion in all extremities  PSYCHIATRIC: No depression, anxiety, mood swings, or difficulty sleeping  HEME/LYMPH: No easy bruising, or bleeding gums  ALLERY AND IMMUNOLOGIC: No hives or eczema  FAMILY HISTORY:  No pertinent family history in first degree relatives      PHYSICAL EXAM:  GENERAL: NAD, well-groomed, well-developed  HEAD:  Atraumatic, Normocephalic  EYES: EOMI, PERRLA, conjunctiva and sclera clear  ENMT: No tonsillar erythema, exudates, or enlargement; Moist mucous membranes, Good dentition, No lesions  NECK: Supple, No JVD, Normal thyroid  NERVOUS SYSTEM:  Alert & Oriented X3, Good concentration; Motor Strength 5/5 B/L upper and lower extremities; DTRs 2+ intact and symmetric  CHEST/LUNG: Clear to percussion bilaterally; No rales, rhonchi, wheezing, or rubs  HEART: Regular rate and rhythm; No murmurs, rubs, or gallops  ABDOMEN: Soft, Nontender, Nondistended; Bowel sounds present  EXTREMITIES:  2+ Peripheral Pulses, full rom, left lower extrmeity erythema , no masses appreciated  LYMPH: No lymphadenopathy noted  SKIN: No rashes or lesions    Consultant(s) Notes Reviewed:  [x ] YES  [ ] NO  Care Discussed with Consultants/Other Providers [ x] YES  [ ] NO    LABS:                        12.8   4.29  )-----------( 175      ( 12 Feb 2018 07:10 )             37.0   02-12    138  |  101  |  12  ----------------------------<  125<H>  4.1   |  31  |  0.7    Ca    8.7      12 Feb 2018 07:10    Mg < from: MR Knee No Cont, Left (02.11.18 @ 12:55) >  Impression:  1. Prepatellar adventitial bursitis with surrounding inflammation  2. 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 Subacute on chronic quadriceps insertional partial tear, high-grade   involving the deep and medial fibers  4. Origin patellar tendon subacute on chronic nonretracted tear involving   less than 50% of fibers  5. Medial meniscal flap tear from body to posterior root  6. Moderate medial compartment osteoarthritis with stress related edema   at periphery of medial tibial plateau  7. Patellar apical median ridge high-grade focal chondral defect with   patchy subchondral edema and degenerative change      < end of copied text >      2.2     02-11    TPro  6.1  /  Alb  3.4  /  TBili  0.6  /  DBili  x   /  AST  26  /  ALT  26  /  AlkPhos  32  02-11      MEDICATIONS  (STANDING):  ampicillin/sulbactam  IVPB 3 Gram(s) IV Intermittent every 6 hours  ampicillin/sulbactam  IVPB      enoxaparin Injectable 40 milliGRAM(s) SubCutaneous every 24 hours  pantoprazole    Tablet 40 milliGRAM(s) Oral before breakfast  vancomycin  IVPB      vancomycin  IVPB 1250 milliGRAM(s) IV Intermittent every 12 hours    MEDICATIONS  (PRN):  aluminum hydroxide/magnesium hydroxide/simethicone Suspension 30 milliLiter(s) Oral every 4 hours PRN Dyspepsia  ibuprofen  Tablet 600 milliGRAM(s) Oral four times a day PRN pain  oxyCODONE    IR 5 milliGRAM(s) Oral every 4 hours PRN Moderate Pain (4 - 6)
tejDIAGNOSIS:   HOSPITAL DAY #:    STATUS POST:    POST OPERATIVE DAY #:     Vital Signs Last 24 Hrs  T(C): 35.8 (13 Feb 2018 07:30), Max: 36.5 (12 Feb 2018 16:02)  T(F): 96.5 (13 Feb 2018 07:30), Max: 97.7 (12 Feb 2018 16:02)  HR: 82 (13 Feb 2018 07:30) (78 - 85)  BP: 123/66 (13 Feb 2018 07:30) (123/66 - 149/86)  BP(mean): --  RR: 82 (13 Feb 2018 07:30) (16 - 82)  SpO2: --    SUBJECTIVE: Pt seen    Pain: YES  [ ]   NO x[ ]   Nausea: [ ] YES [x ] NO           Vomiting: [ ] YES [x ] NO  Flatus: [x ] YES [ ] NO             Bowel Movement: [x ] YES [ ] NO     Void: [ ]YES [ ]No      MALKA DRAINAGE: SIGNIFICANT [ ]   NOT SIGNIFICANT [ ]   NOT APPLICABLE [x ]  YES [ ] NO    General Appearance: Appears well, NAD  Neck: Supple  Chest: Equal expansion bilaterally, equal breath sounds  CV: Pulse regular presently  Abdomen: Soft [x ] YES [ ]NO  DISTENDED [ ] YES [x ] NO TENDERNESS [ ]YES [x ]NO  INCISIONS: HEALING WELL [ ] YES  [ ] NO ERYTHEMA [ ] YES [ ] NO DRAINAGE [ ] YES  [ ] NO  Extremities: Grossly symmetric, CALF TENDERNESS [ ] YES  [ ] NO  left knee no cellulitis no abcess    LABS:                        12.2   2.98  )-----------( 171      ( 13 Feb 2018 04:30 )             34.8     02-13    137  |  103  |  12  ----------------------------<  122<H>  3.9   |  30  |  0.7    Ca    8.5      13 Feb 2018 04:30  Mg     2.1     02-13    TPro  5.9<L>  /  Alb  3.1  /  TBili  0.6  /  DBili  x   /  AST  28  /  ALT  26  /  AlkPhos  29<L>  02-13            ASSESSMENT:     GOOD POST OP COURSE x[x ]  YES  [ ] NO  CONDITION IMPROVING  x[]  YES  [ ]  NO          PLAN:office follow up    CONTINUE PRESENT MANAGEMENT  [x ] YES  [ ] NO
infectious diseases progress note:  ANDREEA HOWELL is a 52yMale patient    CELLULITIS   no complaints      Osteoarthritis  Meniscal injury  Patellar bursitis of right knee - S/p drainage  Cellulitis of left lower extremity      ROS:  CONSTITUTIONAL:  Negative fever or chills, feels well, good appetite  EYES:  Negative  blurry vision or double vision  CARDIOVASCULAR:  Negative for chest pain or palpitations  RESPIRATORY:  Negative for cough, wheezing, or SOB   GASTROINTESTINAL:  Negative for nausea, vomiting, diarrhea, constipation, or abdominal pain  GENITOURINARY:  Negative frequency, urgency or dysuria  NEUROLOGIC:  No headache, confusion, dizziness, lightheadedness    Allergies    No Known Allergies          ANTIBIOTICS/RELEVANT:  antimicrobials  vancomycin  IVPB      vancomycin  IVPB 1250 milliGRAM(s) IV Intermittent every 12 hours    immunologic:    OTHER:  aluminum hydroxide/magnesium hydroxide/simethicone Suspension 30 milliLiter(s) Oral every 4 hours PRN  enoxaparin Injectable 40 milliGRAM(s) SubCutaneous every 24 hours  ibuprofen  Tablet 600 milliGRAM(s) Oral four times a day PRN  oxyCODONE    IR 5 milliGRAM(s) Oral every 4 hours PRN  pantoprazole    Tablet 40 milliGRAM(s) Oral before breakfast      Objective:  T(F): 96.5 (02-13-18 @ 07:30), Max: 97.7 (02-12-18 @ 16:02)  HR: 82 (02-13-18 @ 07:30) (78 - 85)  BP: 123/66 (02-13-18 @ 07:30) (123/66 - 149/86)  RR: 82 (02-13-18 @ 07:30) (16 - 82)  SpO2: --    PHYSICAL EXAM:  Constitutional:Well-developed, well nourished  Eyes:YESI, EOMI  Ear/Nose/Throat: no oral lesion, no sinus tenderness on percussion	  Neck:no JVD, no lymphadenopathy, supple  Respiratory: CTA prudence  Cardiovascular: S1S2 RRR, no murmurs  Gastrointestinal:soft, (+) BS, no HSM  Extremities:no  phlebitis. left knee wound - clean       LABS:                        12.8   4.29  )-----------( 175      ( 12 Feb 2018 07:10 )             37.0     02-12    138  |  101  |  12  ----------------------------<  125<H>  4.1   |  31  |  0.7    Ca    8.7      12 Feb 2018 07:10              MICROBIOLOGY:            RADIOLOGY & ADDITIONAL STUDIES:

## 2018-02-13 NOTE — DISCHARGE NOTE ADULT - PLAN OF CARE
resolution of symptoms c/w PO ABx to complete the course as per ID avoid trauma f/u with orthopedic Sx after d/c resolution of simptoms c/w ABx ; f/u with surgery Dr Ngo

## 2018-02-13 NOTE — DISCHARGE NOTE ADULT - CARE PROVIDER_API CALL
Devendra Quigley), Internal Medicine  [address] [address]  Phone: [phone]  Fax: [phone]    Nahomi Zavala), Surgery  [address] [place] [address]  Phone: [phone]  Fax: [phone]

## 2018-02-13 NOTE — DISCHARGE NOTE ADULT - HOSPITAL COURSE
Pt was admitted for left knee cellulitis and pain with small abscess and patellar bursitis.   In the hospital pt was consulted by ID; Ortho and general Sx; underwent I and D by surgery and was treated with IV ABx.  Clinically improved and was cleared by d/c.  Pt was seen and examined today.

## 2018-02-15 DIAGNOSIS — M19.90 UNSPECIFIED OSTEOARTHRITIS, UNSPECIFIED SITE: ICD-10-CM

## 2018-02-15 DIAGNOSIS — F17.210 NICOTINE DEPENDENCE, CIGARETTES, UNCOMPLICATED: ICD-10-CM

## 2018-02-15 DIAGNOSIS — M70.42 PREPATELLAR BURSITIS, LEFT KNEE: ICD-10-CM

## 2018-02-15 DIAGNOSIS — L03.116 CELLULITIS OF LEFT LOWER LIMB: ICD-10-CM

## 2018-02-15 DIAGNOSIS — M23.207 DERANGEMENT OF UNSPECIFIED MENISCUS DUE TO OLD TEAR OR INJURY, LEFT KNEE: ICD-10-CM

## 2019-08-04 PROBLEM — Z00.00 ENCOUNTER FOR PREVENTIVE HEALTH EXAMINATION: Status: ACTIVE | Noted: 2019-08-04

## 2019-09-03 ENCOUNTER — APPOINTMENT (OUTPATIENT)
Dept: PLASTIC SURGERY | Facility: CLINIC | Age: 54
End: 2019-09-03
Payer: MEDICARE

## 2019-09-03 VITALS — BODY MASS INDEX: 32.93 KG/M2 | WEIGHT: 230 LBS | HEIGHT: 70 IN

## 2019-09-03 DIAGNOSIS — Z72.89 OTHER PROBLEMS RELATED TO LIFESTYLE: ICD-10-CM

## 2019-09-03 PROCEDURE — 99203 OFFICE O/P NEW LOW 30 MIN: CPT

## 2019-09-03 RX ORDER — DOXEPIN HYDROCHLORIDE 150 MG/1
150 CAPSULE ORAL
Refills: 0 | Status: ACTIVE | COMMUNITY

## 2019-09-03 RX ORDER — OMEPRAZOLE, SODIUM BICARBONATE 40; 1680 MG/1; MG/1
40-1680 POWDER, FOR SUSPENSION ORAL
Refills: 0 | Status: ACTIVE | COMMUNITY

## 2019-09-03 RX ORDER — TRAZODONE HYDROCHLORIDE 50 MG/1
50 TABLET ORAL
Refills: 0 | Status: ACTIVE | COMMUNITY

## 2019-09-03 RX ORDER — MELOXICAM 7.5 MG/1
7.5 TABLET ORAL
Refills: 0 | Status: ACTIVE | COMMUNITY

## 2019-09-03 RX ORDER — LEVOTHYROXINE SODIUM 88 MCG
88 TABLET ORAL
Refills: 0 | Status: ACTIVE | COMMUNITY

## 2019-09-03 NOTE — ASSESSMENT
[FreeTextEntry1] : 53 y/o M with mildly restricted left index finger ROM s/p cat bite May/2019\par \par LHD\par retired [employer]\par cat bit left index May 2019--two rounds of oral abx by Dann at that time\par \par Off abx for >3 months\par \par On exam:  left index with mild residual stiffness on terminal flexion--able to passively fully flex and then hold\par acc to pt this has been slowly improving\par no triggering\par no infection\par \par offered OT for ROM--pt prefers to do on own\par I think this is reasonable--instructed in ROM exercises\par \par to see [facility] ortho tomorrow for left shoulder replacement planning\par \par pt not concerned "my girldfriend made me come"\par all ?s answered

## 2019-09-03 NOTE — PHYSICAL EXAM
[de-identified] : well-appearing, NAD [de-identified] : left hand 2nd digit with mildly restricted flexion at PIPj, normal flexion at MCP/DIP, intact extension, sensory exam normal, no open wounds, no s/s cellulitis

## 2019-09-03 NOTE — HISTORY OF PRESENT ILLNESS
[FreeTextEntry1] : Pt is a 53 y/o M, LHD, with PMH of hypothyroidism, who presents for evaluation of left index finger injury sustained May 2019. Pt states his cat accidentally bit him and since then he cannot bend the finger all the way. No surgical treatment given. Pt has had two courses of PO abx. No imaging done. \par \par Nonsmoker, nondiabetic\par Pt has had 5 left shoulder and 3 right shoulder surgeries, [facility]

## 2020-01-01 NOTE — DISCHARGE NOTE ADULT - MEDICATION SUMMARY - MEDICATIONS TO CHANGE
Attended  of this term, viable, male infant. Baby cried immediately. To mom's chest; dried and stim'd. Pinked up easily. HAQ; vigorous. Skin to skin initiated immediately.  
Written discharge instructions given and explained to pt's mother via [facility] interpreter.  Pt's mother verbalized understanding.  Envelope including pt's discharge summary, hearing screen results, and copy of PKU form given to mother, and instructed mother to give to infant's pediatrician at infant's follow up visit.  Mother verbalized understanding.  All questions answered. Pt discharged from unit with mother. Respirations even and unlabored.      
I will SWITCH the dose or number of times a day I take the medications listed below when I get home from the hospital:  None

## 2021-03-19 ENCOUNTER — NON-APPOINTMENT (OUTPATIENT)
Age: 56
End: 2021-03-19

## 2021-03-24 ENCOUNTER — APPOINTMENT (OUTPATIENT)
Dept: OTOLARYNGOLOGY | Facility: CLINIC | Age: 56
End: 2021-03-24
Payer: MEDICARE

## 2021-03-24 ENCOUNTER — NON-APPOINTMENT (OUTPATIENT)
Age: 56
End: 2021-03-24

## 2021-03-24 DIAGNOSIS — J32.9 CHRONIC SINUSITIS, UNSPECIFIED: ICD-10-CM

## 2021-03-24 DIAGNOSIS — J34.89 OTHER SPECIFIED DISORDERS OF NOSE AND NASAL SINUSES: ICD-10-CM

## 2021-03-24 PROCEDURE — 31231 NASAL ENDOSCOPY DX: CPT

## 2021-03-24 PROCEDURE — 99204 OFFICE O/P NEW MOD 45 MIN: CPT | Mod: 25

## 2021-03-24 RX ORDER — FLUTICASONE PROPIONATE 50 UG/1
50 SPRAY, METERED NASAL DAILY
Qty: 1 | Refills: 7 | Status: ACTIVE | COMMUNITY
Start: 2021-03-24 | End: 1900-01-01

## 2021-03-24 RX ORDER — FEXOFENADINE HCL AND PSEUDOEPHEDRINE HCI 60; 120 MG/1; MG/1
60-120 TABLET, EXTENDED RELEASE ORAL
Qty: 30 | Refills: 3 | Status: ACTIVE | COMMUNITY
Start: 2021-03-24 | End: 1900-01-01

## 2021-03-24 NOTE — PHYSICAL EXAM
[Nasal Endoscopy Performed] : nasal endoscopy was performed, see procedure section for findings [Midline] : trachea located in midline position [de-identified] : hypertrophic, engorged [de-identified] : uvula elongated [Normal] : no neck adenopathy

## 2021-03-24 NOTE — HISTORY OF PRESENT ILLNESS
[de-identified] : Patient presents today with c/o frontal sinus pressure.Minimal nasal congestion Has been present for years. He admits most pressure is in his forehead. Patient denies any headaches. Facial pressure. H/o nasal allergies. . No nasal sprays or allergy medication. Hx of sleep apnea not on CPAP.

## 2021-04-29 ENCOUNTER — APPOINTMENT (OUTPATIENT)
Dept: OTOLARYNGOLOGY | Facility: CLINIC | Age: 56
End: 2021-04-29
Payer: MEDICARE

## 2021-04-29 DIAGNOSIS — J30.9 ALLERGIC RHINITIS, UNSPECIFIED: ICD-10-CM

## 2021-04-29 DIAGNOSIS — J34.2 DEVIATED NASAL SEPTUM: ICD-10-CM

## 2021-04-29 DIAGNOSIS — R51.9 HEADACHE, UNSPECIFIED: ICD-10-CM

## 2021-04-29 PROCEDURE — 31231 NASAL ENDOSCOPY DX: CPT

## 2021-04-29 PROCEDURE — 99213 OFFICE O/P EST LOW 20 MIN: CPT | Mod: 25

## 2021-04-29 RX ORDER — MONTELUKAST 10 MG/1
10 TABLET, FILM COATED ORAL DAILY
Qty: 30 | Refills: 6 | Status: ACTIVE | COMMUNITY
Start: 2021-04-29 | End: 1900-01-01

## 2021-04-29 NOTE — REASON FOR VISIT
[Subsequent Evaluation] : a subsequent evaluation for [FreeTextEntry2] : frontal headache, nasal obstruction, chronic sinusitis

## 2021-04-29 NOTE — HISTORY OF PRESENT ILLNESS
[FreeTextEntry1] : Patient following up on frontal headache, nasal obstruction, chronic sinusitis. Patient denies using nasal spray or allergy medication. Here to discuss CT results that were reviewed by me. Pt is has watery eyes and mild frontal pressure. Pt is on allergy meds and pt is doing overall well. Pt sill has some symptoms.

## 2021-04-29 NOTE — PHYSICAL EXAM
[Nasal Endoscopy Performed] : nasal endoscopy was performed, see procedure section for findings [] : septum deviated bilaterally [de-identified] : hypertrophic, engorged [Midline] : trachea located in midline position [de-identified] : uvula elongated [Normal] : no neck adenopathy

## 2021-04-29 NOTE — PROCEDURE
[Recalcitrant Symptoms] : recalcitrant symptoms  [None] : none [Rigid Endoscope] : examined with a rigid endoscope [S-Shaped Deviated] : S-shape deviation [Normal] : the paranasal sinuses had no abnormalities

## 2021-07-29 ENCOUNTER — APPOINTMENT (OUTPATIENT)
Dept: OTOLARYNGOLOGY | Facility: CLINIC | Age: 56
End: 2021-07-29

## 2022-03-30 ENCOUNTER — EMERGENCY (EMERGENCY)
Facility: HOSPITAL | Age: 57
LOS: 0 days | Discharge: HOME | End: 2022-03-30
Attending: STUDENT IN AN ORGANIZED HEALTH CARE EDUCATION/TRAINING PROGRAM | Admitting: STUDENT IN AN ORGANIZED HEALTH CARE EDUCATION/TRAINING PROGRAM
Payer: MEDICARE

## 2022-03-30 VITALS
HEART RATE: 99 BPM | SYSTOLIC BLOOD PRESSURE: 129 MMHG | RESPIRATION RATE: 18 BRPM | OXYGEN SATURATION: 98 % | TEMPERATURE: 98 F | DIASTOLIC BLOOD PRESSURE: 75 MMHG

## 2022-03-30 VITALS
HEIGHT: 71 IN | WEIGHT: 220.02 LBS | SYSTOLIC BLOOD PRESSURE: 130 MMHG | DIASTOLIC BLOOD PRESSURE: 79 MMHG | RESPIRATION RATE: 18 BRPM | OXYGEN SATURATION: 97 % | HEART RATE: 91 BPM | TEMPERATURE: 98 F
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
## 2022-03-30 DIAGNOSIS — M25.562 PAIN IN LEFT KNEE: ICD-10-CM

## 2022-03-30 DIAGNOSIS — Z98.890 OTHER SPECIFIED POSTPROCEDURAL STATES: Chronic | ICD-10-CM

## 2022-03-30 LAB
ANION GAP SERPL CALC-SCNC: 11 MMOL/L — SIGNIFICANT CHANGE UP (ref 7–14)
BASOPHILS # BLD AUTO: 0.02 K/UL — SIGNIFICANT CHANGE UP (ref 0–0.2)
BASOPHILS NFR BLD AUTO: 0.2 % — SIGNIFICANT CHANGE UP (ref 0–1)
BUN SERPL-MCNC: 15 MG/DL — SIGNIFICANT CHANGE UP (ref 10–20)
CALCIUM SERPL-MCNC: 9.7 MG/DL — SIGNIFICANT CHANGE UP (ref 8.5–10.1)
CHLORIDE SERPL-SCNC: 99 MMOL/L — SIGNIFICANT CHANGE UP (ref 98–110)
CO2 SERPL-SCNC: 28 MMOL/L — SIGNIFICANT CHANGE UP (ref 17–32)
CREAT SERPL-MCNC: 0.9 MG/DL — SIGNIFICANT CHANGE UP (ref 0.7–1.5)
EGFR: 100 ML/MIN/1.73M2 — SIGNIFICANT CHANGE UP
EOSINOPHIL # BLD AUTO: 0.03 K/UL — SIGNIFICANT CHANGE UP (ref 0–0.7)
EOSINOPHIL NFR BLD AUTO: 0.2 % — SIGNIFICANT CHANGE UP (ref 0–8)
GLUCOSE SERPL-MCNC: 114 MG/DL — HIGH (ref 70–99)
HCT VFR BLD CALC: 45.9 % — SIGNIFICANT CHANGE UP (ref 42–52)
HGB BLD-MCNC: 16.3 G/DL — SIGNIFICANT CHANGE UP (ref 14–18)
IMM GRANULOCYTES NFR BLD AUTO: 0.2 % — SIGNIFICANT CHANGE UP (ref 0.1–0.3)
LYMPHOCYTES # BLD AUTO: 0.67 K/UL — LOW (ref 1.2–3.4)
LYMPHOCYTES # BLD AUTO: 5.1 % — LOW (ref 20.5–51.1)
MCHC RBC-ENTMCNC: 32.9 PG — HIGH (ref 27–31)
MCHC RBC-ENTMCNC: 35.5 G/DL — SIGNIFICANT CHANGE UP (ref 32–37)
MCV RBC AUTO: 92.7 FL — SIGNIFICANT CHANGE UP (ref 80–94)
MONOCYTES # BLD AUTO: 0.54 K/UL — SIGNIFICANT CHANGE UP (ref 0.1–0.6)
MONOCYTES NFR BLD AUTO: 4.1 % — SIGNIFICANT CHANGE UP (ref 1.7–9.3)
NEUTROPHILS # BLD AUTO: 11.76 K/UL — HIGH (ref 1.4–6.5)
NEUTROPHILS NFR BLD AUTO: 90.2 % — HIGH (ref 42.2–75.2)
NRBC # BLD: 0 /100 WBCS — SIGNIFICANT CHANGE UP (ref 0–0)
PLATELET # BLD AUTO: 178 K/UL — SIGNIFICANT CHANGE UP (ref 130–400)
POTASSIUM SERPL-MCNC: 5.3 MMOL/L — HIGH (ref 3.5–5)
POTASSIUM SERPL-SCNC: 5.3 MMOL/L — HIGH (ref 3.5–5)
RBC # BLD: 4.95 M/UL — SIGNIFICANT CHANGE UP (ref 4.7–6.1)
RBC # FLD: 11.4 % — LOW (ref 11.5–14.5)
SODIUM SERPL-SCNC: 138 MMOL/L — SIGNIFICANT CHANGE UP (ref 135–146)
WBC # BLD: 13.05 K/UL — HIGH (ref 4.8–10.8)
WBC # FLD AUTO: 13.05 K/UL — HIGH (ref 4.8–10.8)

## 2022-03-30 PROCEDURE — 73562 X-RAY EXAM OF KNEE 3: CPT | Mod: 26,LT

## 2022-03-30 PROCEDURE — 99284 EMERGENCY DEPT VISIT MOD MDM: CPT | Mod: FS

## 2022-03-30 PROCEDURE — 73706 CT ANGIO LWR EXTR W/O&W/DYE: CPT | Mod: 26,LT,MA

## 2022-03-30 NOTE — ED PROVIDER NOTE - NSFOLLOWUPINSTRUCTIONS_ED_ALL_ED_FT
Keep on knee immobilizer until you follow up with orthopedic surgery. Return for numbness, weakness or tingling to foot, severe pain, swelling that does not improve w/ elevation or other concerning symptoms.        Acute Knee Pain, Adult      Acute knee pain is sudden and may be caused by damage, swelling, or irritation of the muscles and tissues that support the knee. Pain may result from:  •A fall.      •An injury to the knee from twisting motions.      •A hit to the knee.      •Infection.      Acute knee pain may go away on its own with time and rest. If it does not, your health care provider may order tests to find the cause of the pain. These may include:  •Imaging tests, such as an X-ray, MRI, CT scan, or ultrasound.      •Joint aspiration. In this test, fluid is removed from the knee and evaluated.      •Arthroscopy. In this test, a lighted tube is inserted into the knee and an image is projected onto a TV screen.      •Biopsy. In this test, a sample of tissue is removed from the body and studied under a microscope.        Follow these instructions at home:      If you have a knee sleeve or brace:      •Wear the knee sleeve or brace as told by your health care provider. Remove it only as told by your health care provider.      •Loosen it if your toes tingle, become numb, or turn cold and blue.      •Keep it clean.    •If the knee sleeve or brace is not waterproof:  •Do not let it get wet.      •Cover it with a watertight covering when you take a bath or shower.        Activity     •Rest your knee.      • Do not do things that cause pain or make pain worse.      •Avoid high-impact activities or exercises, such as running, jumping rope, or doing jumping jacks.      •Work with a physical therapist to make a safe exercise program, as recommended by your health care provider. Do exercises as told by your physical therapist.        Managing pain, stiffness, and swelling    •If directed, put ice on the affected knee. To do this:  •If you have a removable knee sleeve or brace, remove it as told by your health care provider.      •Put ice in a plastic bag.      •Place a towel between your skin and the bag.      •Leave the ice on for 20 minutes, 2–3 times a day.      •Remove the ice if your skin turns bright red. This is very important. If you cannot feel pain, heat, or cold, you have a greater risk of damage to the area.        •If directed, use an elastic bandage to put pressure (compression) on your injured knee. This may control swelling, give support, and help with discomfort.      •Raise (elevate) your knee above the level of your heart while you are sitting or lying down.      •Sleep with a pillow under your knee.      General instructions     •Take over-the-counter and prescription medicines only as told by your health care provider.      • Do not use any products that contain nicotine or tobacco, such as cigarettes, e-cigarettes, and chewing tobacco. If you need help quitting, ask your health care provider.      •If you are overweight, work with your health care provider and a dietitian to set a weight-loss goal that is healthy and reasonable for you. Extra weight can put pressure on your knee.      •Pay attention to any changes in your symptoms.      •Keep all follow-up visits. This is important.        Contact a health care provider if:    •Your knee pain continues, changes, or gets worse.      •You have a fever along with knee pain.      •Your knee feels warm to the touch or is red.      •Your knee sabra or locks up.        Get help right away if:    •Your knee swells, and the swelling becomes worse.      •You cannot move your knee.      •You have severe pain in your knee that cannot be managed with pain medicine.        Summary    •Acute knee pain can be caused by a fall, an injury, an infection, or damage, swelling, or irritation of the tissues that support your knee.      •Your health care provider may perform tests to find out the cause of the pain.      •Pay attention to any changes in your symptoms. Relieve your pain with rest, medicines, light activity, and the use of ice.      •Get help right away if your knee swells, you cannot move your knee, or you have severe pain that cannot be managed with medicine.      This information is not intended to replace advice given to you by your health care provider. Make sure you discuss any questions you have with your health care provider.

## 2022-03-30 NOTE — ED PROCEDURE NOTE - NS ED PERI NEURO NEG
Pre-application: Motor, sensory, and vascular responses intact in the injured extremity./Post-application: Motor, sensory, and vascular responses intact in the injured extremity.
25-Jan-2021 19:26

## 2022-03-30 NOTE — ED PROVIDER NOTE - NS ED ATTENDING STATEMENT MOD
This was a shared visit with the [facility]. I reviewed and verified the documentation and independently performed the documented:

## 2022-03-30 NOTE — ED PROVIDER NOTE - ATTENDING CONTRIBUTION TO CARE
57 yo m denies pmh  pt presents for eval of L knee pain. pt states he slipped and had hyperextension injury. no other trauma. pt unable to extend leg or bear weight.  no numbness to foot.    vss  gen- NAD, aaox3  card-rrr  lungs-ctab, no wheezing or rhonchi  neuro- unable to extend at L knee otherwise full str, sensation intact x4, cn ii-xii grossly intact, normal coordination   Pelvis- stable, no tenderness  LLE- deformity to mid anterior thigh, unable to extend at knee, sensation intact, ankle flex/ext intact, able to wiggle toes, mild knee laxity, L foot somewhat cooler than R, PT 2+    r/o fx, c/f quad rupture, possible knee dislocation/reduction  will get basic labs, CTA LLE r/o vascular injury given possible dislocation  analgesia offered

## 2022-03-30 NOTE — ED ADULT TRIAGE NOTE - CHIEF COMPLAINT QUOTE
pt states slipped on kitchen floor and his left knee bent backwards, fell to the ground, NO Head injury, NO LOC complaining of left knee pain

## 2022-03-30 NOTE — ED PROVIDER NOTE - PATIENT PORTAL LINK FT
You can access the FollowMyHealth Patient Portal offered by [facility] by registering at the following website: [email or web address]. By joining [employer]’s FollowMyHealth portal, you will also be able to view your health information using other applications (apps) compatible with our system.

## 2022-03-30 NOTE — ED PROVIDER NOTE - CARE PROVIDER_API CALL
Diogo Siddiqui)  Orthopaedic Surgery  [address] [place] [address]  Phone: [phone]  Fax: [phone]  Follow Up Time:

## 2022-09-22 ENCOUNTER — EMERGENCY (EMERGENCY)
Facility: HOSPITAL | Age: 57
LOS: 0 days | Discharge: HOME | End: 2022-09-22
Attending: EMERGENCY MEDICINE | Admitting: EMERGENCY MEDICINE

## 2022-09-22 VITALS
DIASTOLIC BLOOD PRESSURE: 70 MMHG | HEIGHT: 71 IN | HEART RATE: 96 BPM | WEIGHT: 220.02 LBS | OXYGEN SATURATION: 95 % | RESPIRATION RATE: 20 BRPM | SYSTOLIC BLOOD PRESSURE: 121 MMHG | TEMPERATURE: 96 F

## 2022-09-22 DIAGNOSIS — Z86.19 PERSONAL HISTORY OF OTHER INFECTIOUS AND PARASITIC DISEASES: ICD-10-CM
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
## 2022-09-22 DIAGNOSIS — M25.561 PAIN IN RIGHT KNEE: ICD-10-CM

## 2022-09-22 DIAGNOSIS — Z98.890 OTHER SPECIFIED POSTPROCEDURAL STATES: Chronic | ICD-10-CM

## 2022-09-22 PROCEDURE — 73562 X-RAY EXAM OF KNEE 3: CPT | Mod: 26,RT

## 2022-09-22 PROCEDURE — 99283 EMERGENCY DEPT VISIT LOW MDM: CPT

## 2022-09-22 NOTE — ED PROVIDER NOTE - PATIENT PORTAL LINK FT
You can access the FollowMyHealth Patient Portal offered by [facility] by registering at the following website: [email or web address]. By joining [employer]’s FollowMyHealth portal, you will also be able to view your health information using other applications (apps) compatible with our system.

## 2022-09-22 NOTE — ED PROVIDER NOTE - OBJECTIVE STATEMENT
57-year-old female presenting for evaluation of right knee pain.  Patient states that approximately 4 to 5 days ago, was exercising when he felt his right knee gave out.  Since then has been experiencing pain to site.  Today states that pain became worse, no other acute complaints.  Weightbearing.  Per patient, had history of prior left knee infection, so was concerned about infection to right knee.  No fevers or chills.  Minimal swelling.  No numbness/focal weakness.  Gradual onset.

## 2022-09-22 NOTE — ED PROVIDER NOTE - CLINICAL SUMMARY MEDICAL DECISION MAKING FREE TEXT BOX
57-year-old female presenting for evaluation of right knee pain.  Patient states that approximately 4 to 5 days ago, was exercising when he felt his right knee gave out.  Since then has been experiencing pain to site.  Today states that pain became worse, no other acute complaints.  Weightbearing.  Per patient, had history of prior left knee infection, so was concerned about infection to right knee.  No fevers or chills.  Minimal swelling.  No numbness/focal weakness.  Gradual onset. imaging reviewed. no acute sx cellulitis at this time. offered abx if pt were to develop worsening pain/fever/swelling, pt declining. offered to ace wrap knee, pt declining. Comfortable with discharge and follow-up outpatient, strict return precautions given. Endorses understanding of all of this and aware that they can return at any time for new or concerning symptoms. No further questions or concerns at this time

## 2022-09-22 NOTE — ED PROVIDER NOTE - PHYSICAL EXAMINATION
Constitutional: Well appearing. No acute distress. Non toxic.   Eyes: PERRLA. Extraocular movements intact, no entrapment. Conjunctiva normal.   ENT: No nasal discharge. Moist mucus membranes.     Pulm:   Normal work of breathing.     Ext: Warm and well perfused x4, moving all extremities, no edema. 2+ equal pulses throughout <2sec capillary refill throughout mild ttp to R knee. no gross deformities. no effusion. FROM, active and passive. no overlying erythema/warmth. no crepitus/induration.   Psy: Cooperative, appropriate.   Skin: Warm, dry, no rash  Neuro: CN2-12 grossly intact no sensory or motor deficits throughout, no drift

## 2022-09-22 NOTE — ED PROVIDER NOTE - NSFOLLOWUPINSTRUCTIONS_ED_ALL_ED_FT
Please follow-up with your orthopedist. Return for any new or concerning symptoms.    Knee Pain    Knee pain is a very common symptom and can have many causes. Knee pain often goes away when you follow your health care provider's instructions for relieving pain and discomfort at home. However, knee pain can develop into a condition that needs treatment. Some conditions may include:     Arthritis caused by wear and tear (osteoarthritis).  Arthritis caused by swelling and irritation (rheumatoid arthritis or gout).  A cyst or growth in your knee.  An infection in your knee joint.  An injury that will not heal.  Damage, swelling, or irritation of the tissues that support your knee (torn ligaments or tendinitis).    If your knee pain continues, additional tests may be ordered to diagnose your condition. Tests may include X-rays or other imaging studies of your knee. You may also need to have fluid removed from your knee. Treatment for ongoing knee pain depends on the cause, but treatment may include:    Medicines to relieve pain or swelling.  Steroid injections in your knee.  Physical therapy.  Surgery.    HOME CARE INSTRUCTIONS  Take medicines only as directed by your health care provider.   Rest your knee and keep it raised (elevated) while you are resting.  Do not do things that cause or worsen pain.  Avoid high-impact activities or exercises, such as running, jumping rope, or doing jumping jacks.  Apply ice to the knee area:  Put ice in a plastic bag.  Place a towel between your skin and the bag.  Leave the ice on for 20 minutes, 2–3 times a day.  Ask your health care provider if you should wear an elastic knee support.  Keep a pillow under your knee when you sleep.  Lose weight if you are overweight. Extra weight can put pressure on your knee.  Do not use any tobacco products, including cigarettes, chewing tobacco, or electronic cigarettes. If you need help quitting, ask your health care provider. Smoking may slow the healing of any bone and joint problems that you may have.    SEEK MEDICAL CARE IF:  Your knee pain continues, changes, or gets worse.  You have a fever along with knee pain.  Your knee sabra or locks up.  Your knee becomes more swollen.    SEEK IMMEDIATE MEDICAL CARE IF:  Your knee joint feels hot to the touch.  You have chest pain or trouble breathing.

## 2022-09-22 NOTE — ED ADULT NURSE NOTE - NSFALLRSKOUTCOME_ED_ALL_ED
Patient states that he's gained about 20 pounds from living in the dorms. He knows he can be healthier in terms of exercise and eating a healthier diet. He states this is the infamous freshman 20 that he has gained.   Universal Safety Interventions

## 2022-11-29 ENCOUNTER — TRANSCRIPTION ENCOUNTER (OUTPATIENT)
Age: 57
End: 2022-11-29

## 2022-11-29 RX ORDER — CHLORHEXIDINE GLUCONATE 213 G/1000ML
1 SOLUTION TOPICAL EVERY 12 HOURS
Refills: 0 | Status: COMPLETED | OUTPATIENT
Start: 2022-11-30 | End: 2022-12-01

## 2022-11-29 RX ORDER — POVIDONE-IODINE 5 %
1 AEROSOL (ML) TOPICAL ONCE
Refills: 0 | Status: COMPLETED | OUTPATIENT
Start: 2022-11-30 | End: 2022-11-30

## 2022-11-29 NOTE — H&P ADULT - HISTORY OF PRESENT ILLNESS
57M c/o b/l knee pain  Presents today for elective b/l TKR. 57M c/o b/l knee pain x 8 months s/p a fall from standing that resulted in 2 subsequent left quadricep operations this year. Pain persists despite conservative management. Ambulates without assistance. Denies weakness, numbness, or tingling of bilateral lower extremities. Denies history of DVT.   Presents today for elective b/l TKR.

## 2022-11-29 NOTE — H&P ADULT - NSICDXPASTSURGICALHX_GEN_ALL_CORE_FT
PAST SURGICAL HISTORY:  H/O shoulder surgery      PAST SURGICAL HISTORY:  H/O knee surgery LEFT    H/O shoulder replacement RIGHT    H/O shoulder surgery LEFT SHOULDER REPLACEMENT    History of bilateral carpal tunnel release     History of lung surgery PLICATION

## 2022-11-29 NOTE — H&P ADULT - NSICDXPASTMEDICALHX_GEN_ALL_CORE_FT
PAST MEDICAL HISTORY:  No pertinent past medical history      PAST MEDICAL HISTORY:  No pertinent past medical history     Osteoarthritis

## 2022-11-29 NOTE — H&P ADULT - NSHPPHYSICALEXAM_GEN_ALL_CORE
B/L knee decreased ROM 2/2 pain  Rest of PE per MD clearance Gen: NAD   MSK: EHL/TA/GS/FHL 5/5 BLE. Sensation intact and equal BLE. Skin warm and well perfused. DP palpable BLE.   B/L knee decreased ROM 2/2 pain  Rest of PE per MD clearance

## 2022-11-29 NOTE — PATIENT PROFILE ADULT - FALL HARM RISK - UNIVERSAL INTERVENTIONS
Bed in lowest position, wheels locked, appropriate side rails in place/Call bell, personal items and telephone in reach/Instruct patient to call for assistance before getting out of bed or chair/Non-slip footwear when patient is out of bed/[place] to call system/Physically safe environment - no spills, clutter or unnecessary equipment/Purposeful Proactive Rounding/Room/bathroom lighting operational, light cord in reach

## 2022-11-30 ENCOUNTER — INPATIENT (INPATIENT)
Facility: HOSPITAL | Age: 57
LOS: 1 days | Discharge: HOME HEALTH | DRG: 462 | End: 2022-12-02
Payer: MEDICARE

## 2022-11-30 VITALS
TEMPERATURE: 98 F | WEIGHT: 200.62 LBS | HEIGHT: 70 IN | OXYGEN SATURATION: 99 % | DIASTOLIC BLOOD PRESSURE: 71 MMHG | RESPIRATION RATE: 16 BRPM | SYSTOLIC BLOOD PRESSURE: 122 MMHG | HEART RATE: 85 BPM

## 2022-11-30 DIAGNOSIS — Z98.890 OTHER SPECIFIED POSTPROCEDURAL STATES: Chronic | ICD-10-CM

## 2022-11-30 DIAGNOSIS — Z96.619 PRESENCE OF UNSPECIFIED ARTIFICIAL SHOULDER JOINT: Chronic | ICD-10-CM

## 2022-11-30 DIAGNOSIS — M19.90 UNSPECIFIED OSTEOARTHRITIS, UNSPECIFIED SITE: ICD-10-CM

## 2022-11-30 PROCEDURE — 73560 X-RAY EXAM OF KNEE 1 OR 2: CPT | Mod: 26,50

## 2022-11-30 DEVICE — STEM EXT PERSONA 14MM PLUS 30M: Type: IMPLANTABLE DEVICE | Site: BILATERAL | Status: FUNCTIONAL

## 2022-11-30 DEVICE — FEM PERSONA PS CMT CCT STD SZ 10 RT: Type: IMPLANTABLE DEVICE | Site: BILATERAL | Status: FUNCTIONAL

## 2022-11-30 DEVICE — ZIMMER/NEXGEN SMOOTH PIN 3.2X75MM: Type: IMPLANTABLE DEVICE | Site: BILATERAL | Status: FUNCTIONAL

## 2022-11-30 DEVICE — ZIMMER/NEXGEN HEX HEAD SCREW 3.5MM: Type: IMPLANTABLE DEVICE | Site: BILATERAL | Status: FUNCTIONAL

## 2022-11-30 DEVICE — STEM TIB PSN 5 DEG SZ F R: Type: IMPLANTABLE DEVICE | Site: BILATERAL | Status: FUNCTIONAL

## 2022-11-30 DEVICE — ZIMMER FEMALE HEX SCREW MAGNETIC 2.5MM X 25MM: Type: IMPLANTABLE DEVICE | Site: BILATERAL | Status: FUNCTIONAL

## 2022-11-30 DEVICE — SURF ART PERSONA LT 10-11 EF 10MM: Type: IMPLANTABLE DEVICE | Site: BILATERAL | Status: FUNCTIONAL

## 2022-11-30 DEVICE — FEM PERSONA PS CMT CCR STD SZ 10 L: Type: IMPLANTABLE DEVICE | Site: BILATERAL | Status: FUNCTIONAL

## 2022-11-30 DEVICE — IMPLANTABLE DEVICE: Type: IMPLANTABLE DEVICE | Site: BILATERAL | Status: FUNCTIONAL

## 2022-11-30 DEVICE — PATELLA PERSONA VE CEMENTED 35MM: Type: IMPLANTABLE DEVICE | Site: BILATERAL | Status: FUNCTIONAL

## 2022-11-30 DEVICE — STEM TIB PSN 5 DEG SZF L: Type: IMPLANTABLE DEVICE | Site: BILATERAL | Status: FUNCTIONAL

## 2022-11-30 DEVICE — SURF ART PERSONA RT 10-11 EF 10MM: Type: IMPLANTABLE DEVICE | Site: BILATERAL | Status: FUNCTIONAL

## 2022-11-30 RX ORDER — OXYCODONE HYDROCHLORIDE 5 MG/1
5 TABLET ORAL EVERY 4 HOURS
Refills: 0 | Status: DISCONTINUED | OUTPATIENT
Start: 2022-11-30 | End: 2022-12-02

## 2022-11-30 RX ORDER — SIMVASTATIN 20 MG/1
1 TABLET, FILM COATED ORAL
Qty: 0 | Refills: 0 | DISCHARGE

## 2022-11-30 RX ORDER — SIMVASTATIN 20 MG/1
20 TABLET, FILM COATED ORAL AT BEDTIME
Refills: 0 | Status: DISCONTINUED | OUTPATIENT
Start: 2022-11-30 | End: 2022-12-02

## 2022-11-30 RX ORDER — SODIUM CHLORIDE 9 MG/ML
1000 INJECTION, SOLUTION INTRAVENOUS
Refills: 0 | Status: DISCONTINUED | OUTPATIENT
Start: 2022-12-01 | End: 2022-12-01

## 2022-11-30 RX ORDER — MELOXICAM 15 MG/1
1 TABLET ORAL
Qty: 0 | Refills: 0 | DISCHARGE

## 2022-11-30 RX ORDER — ENOXAPARIN SODIUM 100 MG/ML
40 INJECTION SUBCUTANEOUS EVERY 24 HOURS
Refills: 0 | Status: DISCONTINUED | OUTPATIENT
Start: 2022-12-01 | End: 2022-12-02

## 2022-11-30 RX ORDER — CEFAZOLIN SODIUM 1 G
2000 VIAL (EA) INJECTION EVERY 8 HOURS
Refills: 0 | Status: COMPLETED | OUTPATIENT
Start: 2022-11-30 | End: 2022-12-01

## 2022-11-30 RX ORDER — LEVOTHYROXINE SODIUM 125 MCG
1 TABLET ORAL
Qty: 0 | Refills: 0 | DISCHARGE

## 2022-11-30 RX ORDER — POLYETHYLENE GLYCOL 3350 17 G/17G
17 POWDER, FOR SOLUTION ORAL AT BEDTIME
Refills: 0 | Status: DISCONTINUED | OUTPATIENT
Start: 2022-11-30 | End: 2022-12-02

## 2022-11-30 RX ORDER — LEVOTHYROXINE SODIUM 125 MCG
88 TABLET ORAL DAILY
Refills: 0 | Status: DISCONTINUED | OUTPATIENT
Start: 2022-11-30 | End: 2022-12-02

## 2022-11-30 RX ORDER — LEVOTHYROXINE SODIUM 125 MCG
88 TABLET ORAL
Qty: 0 | Refills: 0 | DISCHARGE

## 2022-11-30 RX ORDER — MORPHINE SULFATE 50 MG/1
2 CAPSULE, EXTENDED RELEASE ORAL
Refills: 0 | Status: DISCONTINUED | OUTPATIENT
Start: 2022-11-30 | End: 2022-12-02

## 2022-11-30 RX ORDER — CEFAZOLIN SODIUM 1 G
2000 VIAL (EA) INJECTION EVERY 8 HOURS
Refills: 0 | Status: DISCONTINUED | OUTPATIENT
Start: 2022-11-30 | End: 2022-11-30

## 2022-11-30 RX ORDER — BENZOCAINE AND MENTHOL 5; 1 G/100ML; G/100ML
1 LIQUID ORAL
Refills: 0 | Status: DISCONTINUED | OUTPATIENT
Start: 2022-11-30 | End: 2022-12-02

## 2022-11-30 RX ORDER — KETOROLAC TROMETHAMINE 30 MG/ML
15 SYRINGE (ML) INJECTION EVERY 6 HOURS
Refills: 0 | Status: DISCONTINUED | OUTPATIENT
Start: 2022-11-30 | End: 2022-12-01

## 2022-11-30 RX ORDER — PANTOPRAZOLE SODIUM 20 MG/1
40 TABLET, DELAYED RELEASE ORAL
Refills: 0 | Status: DISCONTINUED | OUTPATIENT
Start: 2022-11-30 | End: 2022-12-02

## 2022-11-30 RX ORDER — CELECOXIB 200 MG/1
200 CAPSULE ORAL EVERY 12 HOURS
Refills: 0 | Status: DISCONTINUED | OUTPATIENT
Start: 2022-12-01 | End: 2022-12-02

## 2022-11-30 RX ORDER — ACETAMINOPHEN 500 MG
650 TABLET ORAL EVERY 6 HOURS
Refills: 0 | Status: DISCONTINUED | OUTPATIENT
Start: 2022-11-30 | End: 2022-12-02

## 2022-11-30 RX ORDER — OXYCODONE HYDROCHLORIDE 5 MG/1
10 TABLET ORAL EVERY 4 HOURS
Refills: 0 | Status: DISCONTINUED | OUTPATIENT
Start: 2022-11-30 | End: 2022-12-02

## 2022-11-30 RX ORDER — MAGNESIUM HYDROXIDE 400 MG/1
30 TABLET, CHEWABLE ORAL DAILY
Refills: 0 | Status: DISCONTINUED | OUTPATIENT
Start: 2022-11-30 | End: 2022-12-02

## 2022-11-30 RX ORDER — SENNA PLUS 8.6 MG/1
2 TABLET ORAL AT BEDTIME
Refills: 0 | Status: DISCONTINUED | OUTPATIENT
Start: 2022-11-30 | End: 2022-12-02

## 2022-11-30 RX ORDER — ONDANSETRON 8 MG/1
4 TABLET, FILM COATED ORAL EVERY 6 HOURS
Refills: 0 | Status: DISCONTINUED | OUTPATIENT
Start: 2022-11-30 | End: 2022-12-02

## 2022-11-30 RX ORDER — OMEPRAZOLE 10 MG/1
1 CAPSULE, DELAYED RELEASE ORAL
Qty: 0 | Refills: 0 | DISCHARGE

## 2022-11-30 RX ORDER — LANOLIN ALCOHOL/MO/W.PET/CERES
5 CREAM (GRAM) TOPICAL AT BEDTIME
Refills: 0 | Status: DISCONTINUED | OUTPATIENT
Start: 2022-11-30 | End: 2022-12-02

## 2022-11-30 RX ORDER — SODIUM CHLORIDE 9 MG/ML
1000 INJECTION, SOLUTION INTRAVENOUS
Refills: 0 | Status: DISCONTINUED | OUTPATIENT
Start: 2022-11-30 | End: 2022-11-30

## 2022-11-30 RX ORDER — HYDROMORPHONE HYDROCHLORIDE 2 MG/ML
0.5 INJECTION INTRAMUSCULAR; INTRAVENOUS; SUBCUTANEOUS
Refills: 0 | Status: DISCONTINUED | OUTPATIENT
Start: 2022-11-30 | End: 2022-12-02

## 2022-11-30 RX ADMIN — SIMVASTATIN 20 MILLIGRAM(S): 20 TABLET, FILM COATED ORAL at 22:53

## 2022-11-30 RX ADMIN — Medication 15 MILLIGRAM(S): at 23:01

## 2022-11-30 RX ADMIN — Medication 650 MILLIGRAM(S): at 23:01

## 2022-11-30 RX ADMIN — Medication 15 MILLIGRAM(S): at 17:30

## 2022-11-30 RX ADMIN — Medication 650 MILLIGRAM(S): at 18:18

## 2022-11-30 RX ADMIN — CHLORHEXIDINE GLUCONATE 1 APPLICATION(S): 213 SOLUTION TOPICAL at 07:58

## 2022-11-30 RX ADMIN — Medication 2000 MILLIGRAM(S): at 18:21

## 2022-11-30 RX ADMIN — Medication 15 MILLIGRAM(S): at 18:00

## 2022-11-30 RX ADMIN — POLYETHYLENE GLYCOL 3350 17 GRAM(S): 17 POWDER, FOR SOLUTION ORAL at 22:53

## 2022-11-30 RX ADMIN — HYDROMORPHONE HYDROCHLORIDE 0.5 MILLIGRAM(S): 2 INJECTION INTRAMUSCULAR; INTRAVENOUS; SUBCUTANEOUS at 18:17

## 2022-11-30 RX ADMIN — Medication 1 APPLICATION(S): at 07:58

## 2022-11-30 RX ADMIN — SENNA PLUS 2 TABLET(S): 8.6 TABLET ORAL at 22:53

## 2022-11-30 RX ADMIN — HYDROMORPHONE HYDROCHLORIDE 0.5 MILLIGRAM(S): 2 INJECTION INTRAMUSCULAR; INTRAVENOUS; SUBCUTANEOUS at 18:40

## 2022-11-30 NOTE — PHYSICAL THERAPY INITIAL EVALUATION ADULT - PHYSICAL ASSIST/NONPHYSICAL ASSIST: STAND/SIT, REHAB EVAL
Catheter care instructions done with patient and wife at bedside. Patient has had a catheter at home before, he will self DC catheter tomorrow. PNDS met, po per I&O sheet. Pt dressed, up in recliner and transported to Phase 2.   
verbal cues/1 person assist

## 2022-11-30 NOTE — PHYSICAL THERAPY INITIAL EVALUATION ADULT - GAIT DEVIATIONS NOTED, PT EVAL
increased time in double stance/decreased velocity of limb motion/decreased step length/decreased stride length/decreased swing-to-stance ratio/decreased weight-shifting ability

## 2022-11-30 NOTE — PHYSICAL THERAPY INITIAL EVALUATION ADULT - GENERAL OBSERVATIONS, REHAB EVAL
Patient received semi-lemons in bed  in NAD on RA, +SCDs, +PIV, +Telemetry. Cleared by ELAINE Callahan. Agreeable to PT.

## 2022-11-30 NOTE — PHYSICAL THERAPY INITIAL EVALUATION ADULT - ADDITIONAL COMMENTS
Patient lives alone in apartment with 10 steps to enter. Doesn't use any Assistive Devices at baseline. Owns standard walker, crutches, commode. Denies recent Hx of falls.

## 2022-11-30 NOTE — PHYSICAL THERAPY INITIAL EVALUATION ADULT - PERTINENT HX OF CURRENT PROBLEM, REHAB EVAL
57M c/o b/l knee pain x 8 months s/p a fall from standing that resulted in 2 subsequent left quadricep operations this year. Pain persists despite conservative management. Ambulates without assistance. Denies weakness, numbness, or tingling of bilateral lower extremities. Denies history of DVT.

## 2022-11-30 NOTE — PRE-OP CHECKLIST - INTERNAL PROSTHESES
yes(specify) LEFT SHOULDER REPLACEMENT, RIGHT SHOULDER PINS AND SCREWS, LEFT KNEE SCREWS/yes(specify)

## 2022-12-01 ENCOUNTER — TRANSCRIPTION ENCOUNTER (OUTPATIENT)
Age: 57
End: 2022-12-01

## 2022-12-01 DIAGNOSIS — E78.5 HYPERLIPIDEMIA, UNSPECIFIED: ICD-10-CM

## 2022-12-01 DIAGNOSIS — E87.1 HYPO-OSMOLALITY AND HYPONATREMIA: ICD-10-CM

## 2022-12-01 DIAGNOSIS — Z98.890 OTHER SPECIFIED POSTPROCEDURAL STATES: ICD-10-CM

## 2022-12-01 LAB
ANION GAP SERPL CALC-SCNC: 8 MMOL/L — SIGNIFICANT CHANGE UP (ref 5–17)
BUN SERPL-MCNC: 16 MG/DL — SIGNIFICANT CHANGE UP (ref 7–23)
CALCIUM SERPL-MCNC: 8.6 MG/DL — SIGNIFICANT CHANGE UP (ref 8.4–10.5)
CHLORIDE SERPL-SCNC: 99 MMOL/L — SIGNIFICANT CHANGE UP (ref 96–108)
CO2 SERPL-SCNC: 27 MMOL/L — SIGNIFICANT CHANGE UP (ref 22–31)
CREAT SERPL-MCNC: 0.74 MG/DL — SIGNIFICANT CHANGE UP (ref 0.5–1.3)
EGFR: 105 ML/MIN/1.73M2 — SIGNIFICANT CHANGE UP
GLUCOSE SERPL-MCNC: 133 MG/DL — HIGH (ref 70–99)
HCT VFR BLD CALC: 33.4 % — LOW (ref 39–50)
HCV AB S/CO SERPL IA: 0.04 S/CO — SIGNIFICANT CHANGE UP
HCV AB SERPL-IMP: SIGNIFICANT CHANGE UP
HGB BLD-MCNC: 11.5 G/DL — LOW (ref 13–17)
MCHC RBC-ENTMCNC: 32.1 PG — SIGNIFICANT CHANGE UP (ref 27–34)
MCHC RBC-ENTMCNC: 34.4 GM/DL — SIGNIFICANT CHANGE UP (ref 32–36)
MCV RBC AUTO: 93.3 FL — SIGNIFICANT CHANGE UP (ref 80–100)
NRBC # BLD: 0 /100 WBCS — SIGNIFICANT CHANGE UP (ref 0–0)
PLATELET # BLD AUTO: 174 K/UL — SIGNIFICANT CHANGE UP (ref 150–400)
POTASSIUM SERPL-MCNC: 4.3 MMOL/L — SIGNIFICANT CHANGE UP (ref 3.5–5.3)
POTASSIUM SERPL-SCNC: 4.3 MMOL/L — SIGNIFICANT CHANGE UP (ref 3.5–5.3)
RBC # BLD: 3.58 M/UL — LOW (ref 4.2–5.8)
RBC # FLD: 11.6 % — SIGNIFICANT CHANGE UP (ref 10.3–14.5)
SODIUM SERPL-SCNC: 134 MMOL/L — LOW (ref 135–145)
WBC # BLD: 11.03 K/UL — HIGH (ref 3.8–10.5)
WBC # FLD AUTO: 11.03 K/UL — HIGH (ref 3.8–10.5)

## 2022-12-01 PROCEDURE — 99233 SBSQ HOSP IP/OBS HIGH 50: CPT | Mod: GC

## 2022-12-01 RX ORDER — NALOXONE HYDROCHLORIDE 4 MG/.1ML
4 SPRAY NASAL
Qty: 1 | Refills: 0
Start: 2022-12-01

## 2022-12-01 RX ORDER — ACETAMINOPHEN 500 MG
2 TABLET ORAL
Qty: 0 | Refills: 0 | DISCHARGE
Start: 2022-12-01

## 2022-12-01 RX ORDER — CELECOXIB 200 MG/1
1 CAPSULE ORAL
Qty: 28 | Refills: 0
Start: 2022-12-01 | End: 2022-12-14

## 2022-12-01 RX ORDER — MELOXICAM 15 MG/1
1 TABLET ORAL
Qty: 0 | Refills: 0 | DISCHARGE

## 2022-12-01 RX ORDER — SENNA PLUS 8.6 MG/1
2 TABLET ORAL
Qty: 0 | Refills: 0 | DISCHARGE
Start: 2022-12-01

## 2022-12-01 RX ORDER — POLYETHYLENE GLYCOL 3350 17 G/17G
17 POWDER, FOR SOLUTION ORAL
Qty: 0 | Refills: 0 | DISCHARGE
Start: 2022-12-01

## 2022-12-01 RX ORDER — APIXABAN 2.5 MG/1
1 TABLET, FILM COATED ORAL
Qty: 60 | Refills: 0
Start: 2022-12-01 | End: 2022-12-30

## 2022-12-01 RX ORDER — SODIUM CHLORIDE 9 MG/ML
1000 INJECTION INTRAMUSCULAR; INTRAVENOUS; SUBCUTANEOUS
Refills: 0 | Status: DISCONTINUED | OUTPATIENT
Start: 2022-12-01 | End: 2022-12-02

## 2022-12-01 RX ORDER — OXYCODONE HYDROCHLORIDE 5 MG/1
1 TABLET ORAL
Qty: 30 | Refills: 0
Start: 2022-12-01 | End: 2022-12-07

## 2022-12-01 RX ADMIN — Medication 2000 MILLIGRAM(S): at 01:36

## 2022-12-01 RX ADMIN — SODIUM CHLORIDE 125 MILLILITER(S): 9 INJECTION INTRAMUSCULAR; INTRAVENOUS; SUBCUTANEOUS at 12:36

## 2022-12-01 RX ADMIN — OXYCODONE HYDROCHLORIDE 10 MILLIGRAM(S): 5 TABLET ORAL at 19:38

## 2022-12-01 RX ADMIN — CELECOXIB 200 MILLIGRAM(S): 200 CAPSULE ORAL at 05:54

## 2022-12-01 RX ADMIN — OXYCODONE HYDROCHLORIDE 10 MILLIGRAM(S): 5 TABLET ORAL at 01:14

## 2022-12-01 RX ADMIN — Medication 15 MILLIGRAM(S): at 12:35

## 2022-12-01 RX ADMIN — Medication 15 MILLIGRAM(S): at 00:14

## 2022-12-01 RX ADMIN — Medication 650 MILLIGRAM(S): at 21:25

## 2022-12-01 RX ADMIN — SIMVASTATIN 20 MILLIGRAM(S): 20 TABLET, FILM COATED ORAL at 21:25

## 2022-12-01 RX ADMIN — OXYCODONE HYDROCHLORIDE 10 MILLIGRAM(S): 5 TABLET ORAL at 11:50

## 2022-12-01 RX ADMIN — OXYCODONE HYDROCHLORIDE 10 MILLIGRAM(S): 5 TABLET ORAL at 00:28

## 2022-12-01 RX ADMIN — PANTOPRAZOLE SODIUM 40 MILLIGRAM(S): 20 TABLET, DELAYED RELEASE ORAL at 05:21

## 2022-12-01 RX ADMIN — OXYCODONE HYDROCHLORIDE 10 MILLIGRAM(S): 5 TABLET ORAL at 05:36

## 2022-12-01 RX ADMIN — Medication 650 MILLIGRAM(S): at 05:54

## 2022-12-01 RX ADMIN — CELECOXIB 200 MILLIGRAM(S): 200 CAPSULE ORAL at 17:40

## 2022-12-01 RX ADMIN — ENOXAPARIN SODIUM 40 MILLIGRAM(S): 100 INJECTION SUBCUTANEOUS at 06:07

## 2022-12-01 RX ADMIN — CELECOXIB 200 MILLIGRAM(S): 200 CAPSULE ORAL at 05:21

## 2022-12-01 RX ADMIN — Medication 88 MICROGRAM(S): at 05:36

## 2022-12-01 RX ADMIN — OXYCODONE HYDROCHLORIDE 10 MILLIGRAM(S): 5 TABLET ORAL at 20:38

## 2022-12-01 RX ADMIN — Medication 650 MILLIGRAM(S): at 05:20

## 2022-12-01 RX ADMIN — OXYCODONE HYDROCHLORIDE 10 MILLIGRAM(S): 5 TABLET ORAL at 15:29

## 2022-12-01 RX ADMIN — OXYCODONE HYDROCHLORIDE 10 MILLIGRAM(S): 5 TABLET ORAL at 16:20

## 2022-12-01 RX ADMIN — Medication 15 MILLIGRAM(S): at 05:21

## 2022-12-01 RX ADMIN — Medication 15 MILLIGRAM(S): at 05:54

## 2022-12-01 RX ADMIN — OXYCODONE HYDROCHLORIDE 10 MILLIGRAM(S): 5 TABLET ORAL at 06:20

## 2022-12-01 RX ADMIN — Medication 650 MILLIGRAM(S): at 00:14

## 2022-12-01 RX ADMIN — OXYCODONE HYDROCHLORIDE 10 MILLIGRAM(S): 5 TABLET ORAL at 10:56

## 2022-12-01 RX ADMIN — Medication 650 MILLIGRAM(S): at 12:35

## 2022-12-01 RX ADMIN — Medication 650 MILLIGRAM(S): at 17:39

## 2022-12-01 RX ADMIN — Medication 650 MILLIGRAM(S): at 22:25

## 2022-12-01 NOTE — DISCHARGE NOTE PROVIDER - NSDCCPCAREPLAN_GEN_ALL_CORE_FT
PRINCIPAL DISCHARGE DIAGNOSIS  Diagnosis: Osteoarthritis  Assessment and Plan of Treatment:        PRINCIPAL DISCHARGE DIAGNOSIS  Diagnosis: Osteoarthritis  Assessment and Plan of Treatment: s/p bilateral TKR

## 2022-12-01 NOTE — DISCHARGE NOTE NURSING/CASE MANAGEMENT/SOCIAL WORK - PATIENT PORTAL LINK FT
You can access the FollowMyHealth Patient Portal offered by [facility] by registering at the following website: [email or web address]. By joining [employer]’s FollowMyHealth portal, you will also be able to view your health information using other applications (apps) compatible with our system.

## 2022-12-01 NOTE — DISCHARGE NOTE PROVIDER - NSDCCPTREATMENT_GEN_ALL_CORE_FT
PRINCIPAL PROCEDURE  Procedure: Bilateral total knee arthroplasty  Findings and Treatment:        PRINCIPAL PROCEDURE  Procedure: Bilateral total knee arthroplasty  Findings and Treatment: osteoarthritis of bilateral knees

## 2022-12-01 NOTE — DISCHARGE NOTE NURSING/CASE MANAGEMENT/SOCIAL WORK - NSDCPEFALRISK_GEN_ALL_CORE
For information on Fall & Injury Prevention, visit: [email or web address] OR  [email or web address] OR  https://www.cdc.gov/steadi/patient.html

## 2022-12-01 NOTE — DISCHARGE NOTE PROVIDER - CARE PROVIDER_API CALL
Lucio Solitario)  Orthopaedic Sports Medicine; Orthopaedic Surgery  [address] [place] [address]  Phone: [phone]  Fax: [phone]  Follow Up Time:

## 2022-12-01 NOTE — PROGRESS NOTE ADULT - ASSESSMENT
A/P: 57yMale s/p b/l TKA on 11/30  - Stable overnight  - Pain/Nausea Control  - Home meds  - AM labs pending 12/1  - DVT ppx: LVX 40 QD  - WBS: WBAT b/l LE  - PT: recs TBD  - Dispo: pending PT recs      Ortho Pager [phone] A/P: 57yMale s/p b/l TKA on 11/30  - Stable overnight  - Pain/Nausea Control  - Home meds  - AM labs pending 12/1  - DVT ppx: LVX 40 QD  - WBS: WBAT b/l LE  - PT: recs TBD  - Dispo: pending PT recs -- possible d/c to home 12/1 pending mobility      Ortho Pager [phone]

## 2022-12-01 NOTE — DISCHARGE NOTE PROVIDER - NSDCFUADDINST_GEN_ALL_CORE_FT
Weight bear as tolerated with assistive device.  No strenuous activity, heavy lifting, driving or returning to work until cleared by MD.  You may shower - dressing is water-resistant, no soaking in bathtubs.  Remove dressing after post op day 7, then leave incision open to air. Keep incision clean and dry.  Try to have regular bowel movements, take stool softener or laxative if necessary.  Swelling may travel all the way down leg to foot, this is normal and will subside in a few weeks.  Call to schedule an appt with Dr. Solitario for follow up, if you have staples or sutures they will be removed in office.  Contact your doctor if you experience: fever greater than 101.5, chills, chest pain, difficulty breathing, redness or excessive drainage around the incision, other concerns.  Follow up with your primary care provider.  Weight bear as tolerated with assistive device.  No strenuous activity, heavy lifting, driving or returning to work until cleared by MD.  You may shower - dressing is water-resistant, no soaking in bathtubs.  Remove dressing after post op day 7, then leave incision open to air. Keep incision clean and dry.  Try to have regular bowel movements, take stool softener or laxative if necessary.  Swelling may travel all the way down leg to foot, this is normal and will subside in a few weeks.  Call to schedule an appt with Dr. Solitario for follow up, if you have staples or sutures they will be removed in office.  Contact your doctor if you experience: fever greater than 101.5, chills, chest pain, difficulty breathing, redness or excessive drainage around the incision, other concerns.  Follow up with your primary care provider.   You were already prescribed oxycodone 15 mg tabs 30 day supply on 11/11/22 by Dr. Vicente, take as prescribed.  Weight bear as tolerated with assistive device.  No strenuous activity, heavy lifting, driving or returning to work until cleared by MD.  You may shower - dressing is water-resistant, no soaking in bathtubs.  Remove dressing after post op day 7, then leave incision open to air. Keep incision clean and dry.  Try to have regular bowel movements, take stool softener or laxative if necessary.  Swelling may travel all the way down leg to foot, this is normal and will subside in a few weeks.  Call to schedule an appt with Dr. Solitario for follow up, if you have staples or sutures they will be removed in office.  Contact your doctor if you experience: fever greater than 101.5, chills, chest pain, difficulty breathing, redness or excessive drainage around the incision, other concerns.  Follow up with your primary care provider.   You were already prescribed oxycodone 15 mg tabs 30 day supply on 11/11/22 by Dr. Vicente, take as prescribed.   You were perscribed eliquis 2.5mg twice a day by month for 1 month.

## 2022-12-01 NOTE — DISCHARGE NOTE PROVIDER - NSDCMRMEDTOKEN_GEN_ALL_CORE_FT
acetaminophen 325 mg oral tablet: 2 tab(s) orally every 6 hours as needed for mild pain   celecoxib 200 mg oral capsule: 1 cap(s) orally every 12 hours  Eliquis 2.5 mg oral tablet: 1 tab(s) orally 2 times a day   levothyroxine 88 mcg (0.088 mg) oral capsule: 1 cap(s) orally once a day  Narcan 4 mg/0.1 mL nasal spray: 4 milligram(s) intranasally prn   use only as directed for over-sedation   omeprazole 40 mg oral delayed release capsule: 1 cap(s) orally once a day  oxyCODONE 5 mg oral tablet: 1-2 tab(s) orally every 4-6 hours as needed for moderate to severe post-operative pain MDD:6  polyethylene glycol 3350 oral powder for reconstitution: 17 gram(s) orally once a day (at bedtime) as needed for constipation while taking narcotic medication   senna leaf extract oral tablet: 2 tab(s) orally once a day (at bedtime)as needed for constipation while taking narcotic medication   simvastatin 20 mg oral tablet: 1 tab(s) orally once a day (at bedtime)   acetaminophen 325 mg oral tablet: 2 tab(s) orally every 6 hours as needed for mild pain   celecoxib 200 mg oral capsule: 1 cap(s) orally every 12 hours  Eliquis 2.5 mg oral tablet: 1 tab(s) orally 2 times a day   levothyroxine 88 mcg (0.088 mg) oral capsule: 1 cap(s) orally once a day  Narcan 4 mg/0.1 mL nasal spray: 4 milligram(s) intranasally prn   use only as directed for over-sedation   omeprazole 40 mg oral delayed release capsule: 1 cap(s) orally once a day  polyethylene glycol 3350 oral powder for reconstitution: 17 gram(s) orally once a day (at bedtime) as needed for constipation while taking narcotic medication   senna leaf extract oral tablet: 2 tab(s) orally once a day (at bedtime)as needed for constipation while taking narcotic medication   simvastatin 20 mg oral tablet: 1 tab(s) orally once a day (at bedtime)   acetaminophen 500 mg oral tablet: 2 tab(s) orally every 8 hours, As Needed for mild pain   Do not take more than 3000mg a day MDD:3000mg  celecoxib 200 mg oral capsule: 1 cap(s) orally every 12 hours  Eliquis 2.5 mg oral tablet: 1 tab(s) orally 2 times a day   levothyroxine 88 mcg (0.088 mg) oral capsule: 1 cap(s) orally once a day  Narcan 4 mg/0.1 mL nasal spray: 4 milligram(s) intranasally prn   use only as directed for over-sedation   omeprazole 40 mg oral delayed release capsule: 1 cap(s) orally once a day  polyethylene glycol 3350 oral powder for reconstitution: 17 gram(s) orally once a day (at bedtime) as needed for constipation while taking narcotic medication   senna leaf extract oral tablet: 2 tab(s) orally once a day (at bedtime)as needed for constipation while taking narcotic medication   simvastatin 20 mg oral tablet: 1 tab(s) orally once a day (at bedtime)

## 2022-12-01 NOTE — DISCHARGE NOTE PROVIDER - HOSPITAL COURSE
Admitted to Orthopedic service Dr. Solitario   Surgery on 11/30/22 - bilateral total knee replacement   Rebecca-op Antibiotics  Pain control  DVT prophylaxis  OOB/Physical Therapy Admitted to Orthopedic service Dr. Solitario   Surgery on 11/30/22 - bilateral total knee replacement   Rebecca-op Antibiotics  Pain control  DVT prophylaxis- dcd on 1 month supply of apixaban 2.5mg PO BID   OOB/Physical Therapy

## 2022-12-02 VITALS — RESPIRATION RATE: 17 BRPM | HEART RATE: 107 BPM | OXYGEN SATURATION: 96 %

## 2022-12-02 PROCEDURE — 86803 HEPATITIS C AB TEST: CPT

## 2022-12-02 PROCEDURE — 97116 GAIT TRAINING THERAPY: CPT

## 2022-12-02 PROCEDURE — 97530 THERAPEUTIC ACTIVITIES: CPT

## 2022-12-02 PROCEDURE — 85027 COMPLETE CBC AUTOMATED: CPT

## 2022-12-02 PROCEDURE — 97161 PT EVAL LOW COMPLEX 20 MIN: CPT

## 2022-12-02 PROCEDURE — 73560 X-RAY EXAM OF KNEE 1 OR 2: CPT

## 2022-12-02 PROCEDURE — 99232 SBSQ HOSP IP/OBS MODERATE 35: CPT

## 2022-12-02 PROCEDURE — C1713: CPT

## 2022-12-02 PROCEDURE — 80048 BASIC METABOLIC PNL TOTAL CA: CPT

## 2022-12-02 PROCEDURE — C1776: CPT

## 2022-12-02 RX ORDER — ACETAMINOPHEN 500 MG
2 TABLET ORAL
Qty: 84 | Refills: 0
Start: 2022-12-02 | End: 2022-12-15

## 2022-12-02 RX ADMIN — CELECOXIB 200 MILLIGRAM(S): 200 CAPSULE ORAL at 07:54

## 2022-12-02 RX ADMIN — PANTOPRAZOLE SODIUM 40 MILLIGRAM(S): 20 TABLET, DELAYED RELEASE ORAL at 07:10

## 2022-12-02 RX ADMIN — OXYCODONE HYDROCHLORIDE 10 MILLIGRAM(S): 5 TABLET ORAL at 07:54

## 2022-12-02 RX ADMIN — Medication 650 MILLIGRAM(S): at 07:54

## 2022-12-02 RX ADMIN — OXYCODONE HYDROCHLORIDE 10 MILLIGRAM(S): 5 TABLET ORAL at 00:30

## 2022-12-02 RX ADMIN — ENOXAPARIN SODIUM 40 MILLIGRAM(S): 100 INJECTION SUBCUTANEOUS at 07:11

## 2022-12-02 RX ADMIN — OXYCODONE HYDROCHLORIDE 10 MILLIGRAM(S): 5 TABLET ORAL at 07:11

## 2022-12-02 RX ADMIN — OXYCODONE HYDROCHLORIDE 10 MILLIGRAM(S): 5 TABLET ORAL at 00:00

## 2022-12-02 RX ADMIN — Medication 650 MILLIGRAM(S): at 07:11

## 2022-12-02 RX ADMIN — CELECOXIB 200 MILLIGRAM(S): 200 CAPSULE ORAL at 07:11

## 2022-12-02 NOTE — PROGRESS NOTE ADULT - SUBJECTIVE AND OBJECTIVE BOX
Ortho Note    Pt comfortable without complaints, pain controlled  Denies CP, SOB, N/V, numbness/tingling   Did very well w PT 12/1, likely to clear today for discharge    Vital Signs Last 24 Hrs  T(C): 36.5 (02 Dec 2022 00:07), Max: 36.8 (01 Dec 2022 20:40)  T(F): 97.7 (02 Dec 2022 00:07), Max: 98.3 (01 Dec 2022 20:40)  HR: 108 (02 Dec 2022 00:07) (87 - 108)  BP: 132/74 (02 Dec 2022 00:07) (110/61 - 132/74)  BP(mean): --  RR: 18 (02 Dec 2022 00:07) (16 - 18)  SpO2: 95% (02 Dec 2022 00:07) (94% - 97%)    Parameters below as of 02 Dec 2022 00:07  Patient On (Oxygen Delivery Method): room air    Intermittent mild tachycardia, Otherwise VSS  General: A&Ox3, NAD  B/l LE: Webril/Ace wrap DSG C/D/I  Pulses: Foot WWP; DP pulse 2+; Cap refill < 2 sec  Sensation: SILT distally and symmetric  Motor: TA/EHL/FHL/GS 5/5 and symmetric    Labs:                        11.5   11.03 )-----------( 174      ( 01 Dec 2022 06:14 )             33.4       12-01    134<L>  |  99  |  16  ----------------------------<  133<H>  4.3   |  27  |  0.74    Ca    8.6      01 Dec 2022 06:14                                                  
Ortho Note    Surgery: s/p bilateral TKA   Patient seen and examined at bedside this AM   Pt comfortable without complaints, pain controlled  Denies CP, SOB, N/V, numbness/tingling     Vital Signs Last 24 Hrs  T(C): 36.5 (12-01-22 @ 12:35), Max: 36.5 (12-01-22 @ 08:25)  T(F): 97.7 (12-01-22 @ 12:35), Max: 97.7 (12-01-22 @ 08:25)  HR: 94 (12-01-22 @ 12:35) (87 - 94)  BP: 128/72 (12-01-22 @ 12:35) (110/61 - 128/72)  BP(mean): --  RR: 17 (12-01-22 @ 12:35) (17 - 18)  SpO2: 96% (12-01-22 @ 12:35) (94% - 96%)  AVSS    General: Pt Alert and oriented, NAD  Dressing C/D/I: ace wrap bilateral knees   extremities warm and well perfused   Sensation: intact to light touch bilateral LE   Motor: EHL/FHL/TA/GS 5/5 bilateral LE         A/P: 57yMale POD#1 s/p bilateral TKA     - Stable- labs and VSS   - Pain Control: IV and PO PRN   - DVT ppx: lovenox in house, transition to eliquis 2.5mg BID for d/c- discussed with Dr. Best   - PT, WBS: WBAT, PT/OT  - Dispo: anticipate home with HPT tomorrow     Ortho Pager [phone]
Ortho Note    Subjective:  Pt comfortable without complaints, pain controlled with current pain medication regimen.   Denies CP, SOB, N/V, numbness/tingling   Reviewed plan of care with patient at bedside      Vital Signs Last 24 Hrs  T(C): 37 (12-02-22 @ 05:18), Max: 37 (12-02-22 @ 05:18)  T(F): 98.6 (12-02-22 @ 05:18), Max: 98.6 (12-02-22 @ 05:18)  HR: 107 (12-02-22 @ 09:43) (104 - 107)  BP: 123/75 (12-02-22 @ 05:18) (123/75 - 123/75)  BP(mean): --  RR: 17 (12-02-22 @ 09:43) (17 - 18)  SpO2: 96% (12-02-22 @ 09:43) (95% - 96%)  AVSS    Objective:    Physical Exam:  General: Pt Alert and oriented, NAD  Bilateral L and R Knee aquacells - stained- changed   Pulses: +2 pedal pulses, wwp toes  Sensation: silt intact bilateral lower extremities  Motor: EHL/FHL/TA/GS- 5/5 bilaterally        Plan of Care:  A/P: 57yMale POD#2 s/p bilateral TKA's  - afebrile  - Pain Control- celebrex 200mg PO BID, tylenol 650mg PO Q6h, Morphine Q2h IVp prn breakthrough pain, oxycodone 5-10mg PO Q4h prn moderate to severe pain     - DVT ppx: eliquis 2.5mg PO BID  - PT, WBS: WBAT  - pt progress  - appreciate medicine recs  - Dispo- home pending pt clearance    Ortho Pager [phone]
Ortho Post Op Check    Procedure: b/l TKA   Surgeon: Altaf    Pt comfortable without complaints, pain controlled  Denies CP, SOB, N/V, numbness/tingling     Vital Signs Last 24 Hrs  T(C): 37.1 (11-30-22 @ 18:47), Max: 37.1 (11-30-22 @ 18:47)  T(F): 98.7 (11-30-22 @ 18:47), Max: 98.7 (11-30-22 @ 18:47)  HR: 100 (11-30-22 @ 19:25) (72 - 100)  BP: 134/64 (11-30-22 @ 19:25) (99/65 - 143/76)  BP(mean): 92 (11-30-22 @ 17:29) (74 - 100)  RR: 18 (11-30-22 @ 18:47) (12 - 20)  SpO2: 97% (11-30-22 @ 19:25) (93% - 98%)  I&O's Summary      Physical Exam:  General: Pt Alert and oriented, NAD  B/l Knee with aquacell DSG C/D/I  Pulses: 2+ dp, pt pulses, toes wwp, cap refill <3 sec  Sensation: SILT in sural/saph/sp/dp/tibial distributions b/l LE  Motor: EHL/FHL/TA/GS  firing equally b/l LE              Post-op X-Ray:  Xray b/l Knee: Hardware in place in appropriate alignment, no intra-op fx noted.    A/P: 57yMale POD#0 s/p b/l TKA on 11/30  - Stable  - Pain Control  - f/u AM labs  - DVT ppx: LVX  - Post op abx: periop ancef  - PT, WBS: WBAT  - Dispo: pending PT    Franco Blood, PGY-2  Ortho Pager [phone]
Ortho Note    Pt comfortable without complaints, pain controlled  Denies CP, SOB, N/V, numbness/tingling   Stable overnight postop    Vital Signs Last 24 Hrs  T(C): 36.8 (01 Dec 2022 00:32), Max: 37.1 (30 Nov 2022 18:47)  T(F): 98.3 (01 Dec 2022 00:32), Max: 98.7 (30 Nov 2022 18:47)  HR: 104 (01 Dec 2022 00:32) (72 - 110)  BP: 148/76 (01 Dec 2022 00:32) (99/65 - 158/81)  BP(mean): 92 (30 Nov 2022 17:29) (74 - 100)  RR: 17 (01 Dec 2022 00:32) (12 - 20)  SpO2: 94% (01 Dec 2022 00:32) (93% - 99%)    Parameters below as of 01 Dec 2022 00:32  Patient On (Oxygen Delivery Method): room air          Intermittent mild tachycardia, Otherwise VSS  General: A&Ox3, NAD  B/l LE: Webril/Ace wrap DSG C/D/I  Pulses: Foot WWP; DP pulse 2+; Cap refill < 2 sec  Sensation: SILT distally and symmetric  Motor: TA/EHL/FHL/GS 5/5 and symmetric    Labs:                                  
Interval Events: Reviewed  Patient seen and examined at bedside.    Patient is a 57y old  Male who presents with a chief complaint of B/L knee pain (02 Dec 2022 06:43)  he is doing well    PAST MEDICAL & SURGICAL HISTORY:  No pertinent past medical history      Osteoarthritis      H/O shoulder surgery  LEFT SHOULDER REPLACEMENT      H/O shoulder replacement  RIGHT      H/O knee surgery  LEFT      History of lung surgery  PLICATION      History of bilateral carpal tunnel release          MEDICATIONS:  Pulmonary:    Antimicrobials:    Anticoagulants:  enoxaparin Injectable 40 milliGRAM(s) SubCutaneous every 24 hours    Cardiac:      Allergies    No Known Allergies    Intolerances        Vital Signs Last 24 Hrs  T(C): 37 (02 Dec 2022 05:18), Max: 37 (02 Dec 2022 05:18)  T(F): 98.6 (02 Dec 2022 05:18), Max: 98.6 (02 Dec 2022 05:18)  HR: 107 (02 Dec 2022 09:43) (87 - 108)  BP: 123/75 (02 Dec 2022 05:18) (110/61 - 132/74)  BP(mean): --  RR: 17 (02 Dec 2022 09:43) (16 - 18)  SpO2: 96% (02 Dec 2022 09:43) (95% - 97%)    Parameters below as of 02 Dec 2022 09:43  Patient On (Oxygen Delivery Method): room air        12-01 @ 07:01  -  12-02 @ 07:00  --------------------------------------------------------  IN: 750 mL / OUT: 1600 mL / NET: -850 mL          Review of Systems:   •	General: negative  •	Skin/Breast: negative  •	Ophthalmologic: negative  •	ENMT: negative  •	Respiratory and Thorax: negative  •	Cardiovascular: negative  •	Gastrointestinal: negative  •	Genitourinary: negative  •	Musculoskeletal: negative  •	Neurological: negative  •	Psychiatric: negative  •	Hematology/Lymphatics: negative  •	Endocrine: negative  •	Allergic/Immunologic: negative    Physical Exam:   • Constitutional:	refer to the dietion /Nutritionist note  • Eyes:	EOMI; PERRL; no drainage or redness  • ENMT:	No oral lesions; no gross abnormalities  • Neck	No bruits; no thyromegaly or nodules  • Breasts:	not examined  • Back:	No deformity or limitation of movement  • Respiratory:	Breath Sounds equal & clear to percussion & auscultation, no accessory muscle use  • Cardiovascular:	Regular rate & rhythm, normal S1, S2; no murmurs, gallops or rubs; no S3, S4  • Gastrointestinal:	Soft, non-tender, no hepatosplenomegaly, normal bowel sounds  • Genitourinary:	not examined  • Rectal: not examined  • Extremities:	No cyanosis, clubbing or edema  • Vascular:	Equal and normal pulses (carotid, femoral, dorsalis pedis)  • Neurologica:l	not examined  • Skin:	No lesions; no rash  • Lymph Nodes:	No lymphadedenopathy  • Musculoskeletal:	No joint pain, swelling or deformity; no limitation of movement        LABS:      CBC Full  -  ( 01 Dec 2022 06:14 )  WBC Count : 11.03 K/uL  RBC Count : 3.58 M/uL  Hemoglobin : 11.5 g/dL  Hematocrit : 33.4 %  Platelet Count - Automated : 174 K/uL  Mean Cell Volume : 93.3 fl  Mean Cell Hemoglobin : 32.1 pg  Mean Cell Hemoglobin Concentration : 34.4 gm/dL  Auto Neutrophil # : x  Auto Lymphocyte # : x  Auto Monocyte # : x  Auto Eosinophil # : x  Auto Basophil # : x  Auto Neutrophil % : x  Auto Lymphocyte % : x  Auto Monocyte % : x  Auto Eosinophil % : x  Auto Basophil % : x    12-01    134<L>  |  99  |  16  ----------------------------<  133<H>  4.3   |  27  |  0.74    Ca    8.6      01 Dec 2022 06:14                          RADIOLOGY & ADDITIONAL STUDIES (The following images were personally reviewed):  
Interval Events: Reviewed  Patient seen and examined at bedside.    Patient is a 57y old  Male who presents with a chief complaint of B/L knee pain (01 Dec 2022 15:04)  he is doing well    PAST MEDICAL & SURGICAL HISTORY:  No pertinent past medical history      Osteoarthritis      H/O shoulder surgery  LEFT SHOULDER REPLACEMENT      H/O shoulder replacement  RIGHT      H/O knee surgery  LEFT      History of lung surgery  PLICATION      History of bilateral carpal tunnel release          MEDICATIONS:  Pulmonary:    Antimicrobials:    Anticoagulants:  enoxaparin Injectable 40 milliGRAM(s) SubCutaneous every 24 hours    Cardiac:      Allergies    No Known Allergies    Intolerances        Vital Signs Last 24 Hrs  T(C): 36.7 (01 Dec 2022 16:03), Max: 37 (01 Dec 2022 04:55)  T(F): 98 (01 Dec 2022 16:03), Max: 98.6 (01 Dec 2022 04:55)  HR: 96 (01 Dec 2022 17:00) (86 - 104)  BP: 120/61 (01 Dec 2022 17:00) (110/61 - 148/76)  BP(mean): --  RR: 16 (01 Dec 2022 16:03) (16 - 18)  SpO2: 97% (01 Dec 2022 17:00) (93% - 97%)    Parameters below as of 01 Dec 2022 17:00  Patient On (Oxygen Delivery Method): room air        11-30 @ 07:01 - 12-01 @ 07:00  --------------------------------------------------------  IN: 250 mL / OUT: 1550 mL / NET: -1300 mL    12-01 @ 07:01 - 12-01 @ 21:27  --------------------------------------------------------  IN: 750 mL / OUT: 900 mL / NET: -150 mL          Review of Systems:   •	General: negative  •	Skin/Breast: negative  •	Ophthalmologic: negative  •	ENMT: negative  •	Respiratory and Thorax: negative  •	Cardiovascular: negative  •	Gastrointestinal: negative  •	Genitourinary: negative  •	Musculoskeletal: negative  •	Neurological: negative  •	Psychiatric: negative  •	Hematology/Lymphatics: negative  •	Endocrine: negative  •	Allergic/Immunologic: negative    Physical Exam:   • Constitutional:	refer to the dietion /Nutritionist note  • Eyes:	EOMI; PERRL; no drainage or redness  • ENMT:	No oral lesions; no gross abnormalities  • Neck	No bruits; no thyromegaly or nodules  • Breasts:	not examined  • Back:	No deformity or limitation of movement  • Respiratory:	Breath Sounds equal & clear to percussion & auscultation, no accessory muscle use  • Cardiovascular:	Regular rate & rhythm, normal S1, S2; no murmurs, gallops or rubs; no S3, S4  • Gastrointestinal:	Soft, non-tender, no hepatosplenomegaly, normal bowel sounds  • Genitourinary:	not examined  • Rectal: not examined  • Extremities:	No cyanosis, clubbing or edema  • Vascular:	Equal and normal pulses (carotid, femoral, dorsalis pedis)  • Neurologica:l	not examined  • Skin:	No lesions; no rash  • Lymph Nodes:	No lymphadedenopathy  • Musculoskeletal:	No joint pain, swelling or deformity; no limitation of movement        LABS:      CBC Full  -  ( 01 Dec 2022 06:14 )  WBC Count : 11.03 K/uL  RBC Count : 3.58 M/uL  Hemoglobin : 11.5 g/dL  Hematocrit : 33.4 %  Platelet Count - Automated : 174 K/uL  Mean Cell Volume : 93.3 fl  Mean Cell Hemoglobin : 32.1 pg  Mean Cell Hemoglobin Concentration : 34.4 gm/dL  Auto Neutrophil # : x  Auto Lymphocyte # : x  Auto Monocyte # : x  Auto Eosinophil # : x  Auto Basophil # : x  Auto Neutrophil % : x  Auto Lymphocyte % : x  Auto Monocyte % : x  Auto Eosinophil % : x  Auto Basophil % : x    12-01    134<L>  |  99  |  16  ----------------------------<  133<H>  4.3   |  27  |  0.74    Ca    8.6      01 Dec 2022 06:14                          RADIOLOGY & ADDITIONAL STUDIES (The following images were personally reviewed):

## 2022-12-02 NOTE — PROGRESS NOTE ADULT - PROBLEM SELECTOR PLAN 1
The patient had below the knee replacement.  The pain is controlled.  He participated in physical therapy
The patient had below the knee replacement.  The pain is controlled.  He participated in physical therapy

## 2022-12-02 NOTE — PROGRESS NOTE ADULT - TIME BILLING
Patient seen and examined with house-staff during bedside rounds.  Resident note read, including vitals, physical findings, laboratory data, and radiological reports.   Revisions included below.  Direct personal management at bed side and extensive interpretation of the data.  Plan was outlined and discussed in details with the housestaff.  Decision making of high complexity  Action taken for acute disease activity to reflect the level of care provided:  - medication reconciliation  - review laboratory data
Patient seen and examined with house-staff during bedside rounds.  Resident note read, including vitals, physical findings, laboratory data, and radiological reports.   Revisions included below.  Direct personal management at bed side and extensive interpretation of the data.  Plan was outlined and discussed in details with the housestaff.  Decision making of high complexity  Action taken for acute disease activity to reflect the level of care provided:  - medication reconciliation  - review laboratory data  he is medically stable

## 2022-12-02 NOTE — PROGRESS NOTE ADULT - ASSESSMENT
A/P: 57yMale s/p b/l TKA on 11/30  - Stable overnight  - Pain/Nausea Control  - Home meds  - AM labs stable 12/1  - DVT ppx: LVX 40 QD -- Change to eliquis 2.5 BID for outpatient  - WBS: WBAT b/l LE  - PT: rec'd home  - Dispo: pending PT clearance -- likely 12/2      Ortho Pager [phone]

## 2022-12-02 NOTE — PROGRESS NOTE ADULT - PROBLEM SELECTOR PLAN 3
fluid restriction increase salt intake.  Follow-up on Na and awaiting today's labs
fluid reStriction increase salt intake.  Follow-up consult

## 2022-12-02 NOTE — PROGRESS NOTE ADULT - PROBLEM SELECTOR PLAN 2
The patient is hemodynamically stable.  The pain is controlled.  Patient is on DVT prophylaxis.  Patient is using incentive spirometry.  Oxygen saturation is acceptable.  Advance diet as tolerated.  Advance activity as tolerated.  Monitor for ileus.  Patient is on Laxatives.  Surgical wound is stable.  No indication for monitor bed.
The patient is hemodynamically stable.  The pain is controlled.  Patient is on DVT prophylaxis.  Patient is using incentive spirometry.  Oxygen saturation is acceptable.  Advance diet as tolerated.  Advance activity as tolerated.  Monitor for ileus.  Patient is on Laxatives.  Surgical wound is stable.  No indication for monitor bed.  I informed him the need for be on apixaban prophylaxis for 30 days

## 2022-12-07 DIAGNOSIS — Z96.611 PRESENCE OF RIGHT ARTIFICIAL SHOULDER JOINT: ICD-10-CM

## 2022-12-07 DIAGNOSIS — Z96.612 PRESENCE OF LEFT ARTIFICIAL SHOULDER JOINT: ICD-10-CM

## 2022-12-07 DIAGNOSIS — M17.0 BILATERAL PRIMARY OSTEOARTHRITIS OF KNEE: ICD-10-CM

## 2022-12-07 DIAGNOSIS — Z79.02 LONG TERM (CURRENT) USE OF ANTITHROMBOTICS/ANTIPLATELETS: ICD-10-CM

## 2022-12-07 DIAGNOSIS — E87.1 HYPO-OSMOLALITY AND HYPONATREMIA: ICD-10-CM

## 2022-12-07 DIAGNOSIS — E78.5 HYPERLIPIDEMIA, UNSPECIFIED: ICD-10-CM

## 2023-02-07 VITALS — BODY MASS INDEX: 28.63 KG/M2 | WEIGHT: 200 LBS | HEIGHT: 70 IN

## 2023-09-14 ENCOUNTER — NON-APPOINTMENT (OUTPATIENT)
Age: 58
End: 2023-09-14

## 2023-09-15 DIAGNOSIS — G56.21 LESION OF ULNAR NERVE, RIGHT UPPER LIMB: ICD-10-CM

## 2023-09-15 DIAGNOSIS — G62.9 POLYNEUROPATHY, UNSPECIFIED: ICD-10-CM

## 2023-09-15 DIAGNOSIS — Z87.39 PERSONAL HISTORY OF OTHER DISEASES OF THE MUSCULOSKELETAL SYSTEM AND CONNECTIVE TISSUE: ICD-10-CM

## 2023-09-15 DIAGNOSIS — E66.3 OVERWEIGHT: ICD-10-CM

## 2023-09-15 DIAGNOSIS — E23.7 DISORDER OF PITUITARY GLAND, UNSPECIFIED: ICD-10-CM

## 2023-09-15 DIAGNOSIS — G47.00 INSOMNIA, UNSPECIFIED: ICD-10-CM

## 2023-09-15 DIAGNOSIS — Z81.8 FAMILY HISTORY OF OTHER MENTAL AND BEHAVIORAL DISORDERS: ICD-10-CM

## 2023-09-15 DIAGNOSIS — R68.89 OTHER GENERAL SYMPTOMS AND SIGNS: ICD-10-CM

## 2023-09-15 RX ORDER — MELOXICAM 15 MG/1
15 TABLET ORAL
Refills: 0 | Status: ACTIVE | COMMUNITY

## 2023-09-15 RX ORDER — DOXEPIN HYDROCHLORIDE 100 MG/1
100 CAPSULE ORAL
Refills: 0 | Status: ACTIVE | COMMUNITY

## 2023-09-15 RX ORDER — SIMVASTATIN 10 MG/1
10 TABLET, FILM COATED ORAL
Refills: 0 | Status: ACTIVE | COMMUNITY

## 2023-09-15 RX ORDER — TEMAZEPAM 22.5 MG/1
22.5 CAPSULE ORAL
Refills: 0 | Status: ACTIVE | COMMUNITY

## 2024-11-17 ENCOUNTER — EMERGENCY (EMERGENCY)
Facility: HOSPITAL | Age: 59
LOS: 0 days | Discharge: HOME | End: 2024-11-17
Attending: EMERGENCY MEDICINE
Payer: MEDICARE

## 2024-11-17 VITALS
RESPIRATION RATE: 16 BRPM | OXYGEN SATURATION: 98 % | WEIGHT: 214.95 LBS | SYSTOLIC BLOOD PRESSURE: 144 MMHG | DIASTOLIC BLOOD PRESSURE: 63 MMHG | TEMPERATURE: 98 F | HEART RATE: 71 BPM

## 2024-11-17 DIAGNOSIS — S22.42XA MULTIPLE FRACTURES OF RIBS, LEFT SIDE, INITIAL ENCOUNTER FOR CLOSED FRACTURE: ICD-10-CM

## 2024-11-17 DIAGNOSIS — R07.81 PLEURODYNIA: ICD-10-CM

## 2024-11-17 DIAGNOSIS — Z98.890 OTHER SPECIFIED POSTPROCEDURAL STATES: Chronic | ICD-10-CM
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
## 2024-11-17 DIAGNOSIS — Z96.619 PRESENCE OF UNSPECIFIED ARTIFICIAL SHOULDER JOINT: Chronic | ICD-10-CM
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
## 2024-11-17 PROBLEM — M19.90 UNSPECIFIED OSTEOARTHRITIS, UNSPECIFIED SITE: Chronic | Status: ACTIVE | Noted: 2022-11-29

## 2024-11-17 LAB
ALBUMIN SERPL ELPH-MCNC: 4.6 G/DL — SIGNIFICANT CHANGE UP (ref 3.5–5.2)
ALP SERPL-CCNC: 50 U/L — SIGNIFICANT CHANGE UP (ref 30–115)
ALT FLD-CCNC: 26 U/L — SIGNIFICANT CHANGE UP (ref 0–41)
ANION GAP SERPL CALC-SCNC: 10 MMOL/L — SIGNIFICANT CHANGE UP (ref 7–14)
AST SERPL-CCNC: 25 U/L — SIGNIFICANT CHANGE UP (ref 0–41)
BASOPHILS # BLD AUTO: 0.02 K/UL — SIGNIFICANT CHANGE UP (ref 0–0.2)
BASOPHILS NFR BLD AUTO: 0.3 % — SIGNIFICANT CHANGE UP (ref 0–1)
BILIRUB SERPL-MCNC: 0.2 MG/DL — SIGNIFICANT CHANGE UP (ref 0.2–1.2)
BUN SERPL-MCNC: 12 MG/DL — SIGNIFICANT CHANGE UP (ref 10–20)
CALCIUM SERPL-MCNC: 9.7 MG/DL — SIGNIFICANT CHANGE UP (ref 8.4–10.5)
CHLORIDE SERPL-SCNC: 100 MMOL/L — SIGNIFICANT CHANGE UP (ref 98–110)
CO2 SERPL-SCNC: 28 MMOL/L — SIGNIFICANT CHANGE UP (ref 17–32)
CREAT SERPL-MCNC: 0.9 MG/DL — SIGNIFICANT CHANGE UP (ref 0.7–1.5)
EGFR: 98 ML/MIN/1.73M2 — SIGNIFICANT CHANGE UP
EOSINOPHIL # BLD AUTO: 0.12 K/UL — SIGNIFICANT CHANGE UP (ref 0–0.7)
EOSINOPHIL NFR BLD AUTO: 1.6 % — SIGNIFICANT CHANGE UP (ref 0–8)
GLUCOSE SERPL-MCNC: 94 MG/DL — SIGNIFICANT CHANGE UP (ref 70–99)
HCT VFR BLD CALC: 46 % — SIGNIFICANT CHANGE UP (ref 42–52)
HGB BLD-MCNC: 15.6 G/DL — SIGNIFICANT CHANGE UP (ref 14–18)
IMM GRANULOCYTES NFR BLD AUTO: 0.1 % — SIGNIFICANT CHANGE UP (ref 0.1–0.3)
LYMPHOCYTES # BLD AUTO: 0.81 K/UL — LOW (ref 1.2–3.4)
LYMPHOCYTES # BLD AUTO: 11 % — LOW (ref 20.5–51.1)
MCHC RBC-ENTMCNC: 31.3 PG — HIGH (ref 27–31)
MCHC RBC-ENTMCNC: 33.9 G/DL — SIGNIFICANT CHANGE UP (ref 32–37)
MCV RBC AUTO: 92.4 FL — SIGNIFICANT CHANGE UP (ref 80–94)
MONOCYTES # BLD AUTO: 0.6 K/UL — SIGNIFICANT CHANGE UP (ref 0.1–0.6)
MONOCYTES NFR BLD AUTO: 8.1 % — SIGNIFICANT CHANGE UP (ref 1.7–9.3)
NEUTROPHILS # BLD AUTO: 5.81 K/UL — SIGNIFICANT CHANGE UP (ref 1.4–6.5)
NEUTROPHILS NFR BLD AUTO: 78.9 % — HIGH (ref 42.2–75.2)
NRBC # BLD: 0 /100 WBCS — SIGNIFICANT CHANGE UP (ref 0–0)
PLATELET # BLD AUTO: 204 K/UL — SIGNIFICANT CHANGE UP (ref 130–400)
PMV BLD: 9.5 FL — SIGNIFICANT CHANGE UP (ref 7.4–10.4)
POTASSIUM SERPL-MCNC: 5.2 MMOL/L — HIGH (ref 3.5–5)
POTASSIUM SERPL-SCNC: 5.2 MMOL/L — HIGH (ref 3.5–5)
PROT SERPL-MCNC: 7 G/DL — SIGNIFICANT CHANGE UP (ref 6–8)
RBC # BLD: 4.98 M/UL — SIGNIFICANT CHANGE UP (ref 4.7–6.1)
RBC # FLD: 12.1 % — SIGNIFICANT CHANGE UP (ref 11.5–14.5)
SODIUM SERPL-SCNC: 138 MMOL/L — SIGNIFICANT CHANGE UP (ref 135–146)
TROPONIN T, HIGH SENSITIVITY RESULT: 15 NG/L — SIGNIFICANT CHANGE UP (ref 6–21)
TROPONIN T, HIGH SENSITIVITY RESULT: 15 NG/L — SIGNIFICANT CHANGE UP (ref 6–21)
WBC # BLD: 7.37 K/UL — SIGNIFICANT CHANGE UP (ref 4.8–10.8)
WBC # FLD AUTO: 7.37 K/UL — SIGNIFICANT CHANGE UP (ref 4.8–10.8)

## 2024-11-17 PROCEDURE — 93010 ELECTROCARDIOGRAM REPORT: CPT

## 2024-11-17 PROCEDURE — 71275 CT ANGIOGRAPHY CHEST: CPT | Mod: 26,MC

## 2024-11-17 PROCEDURE — 80053 COMPREHEN METABOLIC PANEL: CPT

## 2024-11-17 PROCEDURE — 99285 EMERGENCY DEPT VISIT HI MDM: CPT | Mod: FS

## 2024-11-17 PROCEDURE — 71275 CT ANGIOGRAPHY CHEST: CPT | Mod: MC

## 2024-11-17 PROCEDURE — 84484 ASSAY OF TROPONIN QUANT: CPT

## 2024-11-17 PROCEDURE — 36000 PLACE NEEDLE IN VEIN: CPT | Mod: XU

## 2024-11-17 PROCEDURE — 99285 EMERGENCY DEPT VISIT HI MDM: CPT | Mod: 25

## 2024-11-17 PROCEDURE — 93005 ELECTROCARDIOGRAM TRACING: CPT

## 2024-11-17 PROCEDURE — 36415 COLL VENOUS BLD VENIPUNCTURE: CPT

## 2024-11-17 PROCEDURE — 85025 COMPLETE CBC W/AUTO DIFF WBC: CPT

## 2024-11-17 NOTE — ED PROVIDER NOTE - CLINICAL SUMMARY MEDICAL DECISION MAKING FREE TEXT BOX
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    On physical exam patient is normocephalic atraumatic pupils equal round reactive light accommodation extraocular muscles intact oropharynx clear chest clear to auscultation bilaterally abdomen is soft nontender nondistended bowel sounds positive no guarding rebound extremities full range of motion no focal deficits noted radial pulse 2+ pedal pulses 2+ no edema noted    Assessment plan patient presents for evaluation of left-sided lateral chest wall pain status post fall suddenly worsening over the past 24 to 36 hours we obtain EKG per my independent evaluation not consistent with STEMI NACSYS with QT prolongation on consistent with arrhythmia we obtain chest x-ray per my independent evaluation Octycine with pneumonia or pneumothorax given this patient's sudden worsening obtain CTA which is negative for PE however does reveal evidence of 2 rib fractures numbers 5 and 7 patient improved here we discussed indications to return this is most likely the explanation for the symptoms I will discharge follow-up to PMD next 24 to 48 hours

## 2024-11-17 NOTE — ED ADULT NURSE NOTE - NSFALLRISKINTERV_ED_ALL_ED

## 2024-11-17 NOTE — ED ADULT NURSE NOTE - NSICDXPASTMEDICALHX_GEN_ALL_CORE_FT
PAST MEDICAL HISTORY:  Arthritis     High cholesterol     Hypothyroid     No pertinent past medical history     Osteoarthritis

## 2024-11-17 NOTE — ED PROVIDER NOTE - NSICDXPASTSURGICALHX_GEN_ALL_CORE_FT
PAST SURGICAL HISTORY:  H/O knee surgery LEFT    H/O shoulder replacement RIGHT    H/O shoulder surgery LEFT SHOULDER REPLACEMENT    History of bilateral carpal tunnel release     History of lung surgery PLICATION

## 2024-11-17 NOTE — ED PROVIDER NOTE - OBJECTIVE STATEMENT
58 y/o male with hx of diaphragmatic repair on left presents to the ED with left rib pain worse with movement s/p mechanical fall one week ago. no abdominal pain , hemoptysis, calf pain. no bruising or swelling. no neck or back pain . patient eval at [facility] and sent to the Ed for evaluation.

## 2024-11-17 NOTE — ED ADULT NURSE NOTE - CAS EDN DISCHARGE ASSESSMENT
12:58 PM Recheck on patient. Discussed with patient ED findings and plan for discharge. Patient was given ED warnings, discharge instructions, and follow up information to go home with. Patient understands and agrees with plan for discharge. Any questions have been answered.     Alert and oriented to person, place and time

## 2024-11-17 NOTE — ED PROVIDER NOTE - PATIENT PORTAL LINK FT
You can access the FollowMyHealth Patient Portal offered by [facility] by registering at the following website: [email or web address]. By joining [email or web address]’s FollowMyHealth portal, you will also be able to view your health information using other applications (apps) compatible with our system.

## (undated) DEVICE — DRSG COBAN 6"

## (undated) DEVICE — VENODYNE/SCD SLEEVE CALF MEDIUM

## (undated) DEVICE — ELCTR BOVIE PENCIL BLADE 10FT

## (undated) DEVICE — DRSG STOCKINETTE IMPERVIOUS XL

## (undated) DEVICE — SAW BLADE STRYKER SAGITTAL 81.5X12.5X1.19MM

## (undated) DEVICE — PREP CHLORAPREP HI-LITE ORANGE 26ML

## (undated) DEVICE — SUT STRATAFIX SPIRAL PDO 0 24CM CP-2

## (undated) DEVICE — DRSG WEBRIL 6"

## (undated) DEVICE — DRAPE 1/2 SHEET 40X57"

## (undated) DEVICE — SUT ETHIBOND 1 30" OS8

## (undated) DEVICE — HOOD T5 PEELAWAY

## (undated) DEVICE — PACK TOTAL KNEE

## (undated) DEVICE — DRSG AQUACEL 3.5 X 10"

## (undated) DEVICE — DRAPE TOWEL BLUE 17" X 24"

## (undated) DEVICE — DRAPE LIGHT HANDLE COVER (BLUE)

## (undated) DEVICE — SUT VICRYL 2-0 27" CT-1

## (undated) DEVICE — SAW BLADE STRYKER SAGITTAL 25X86.5X1.32MM

## (undated) DEVICE — SUT STRATAFIX SPIRAL PDO 1 30CM OS-6

## (undated) DEVICE — MARKING PEN W RULER

## (undated) DEVICE — SYR ASEPTO

## (undated) DEVICE — SUT STRATAFIX SYMMETRIC PDS 1 45CM OS-6

## (undated) DEVICE — GLV 7.5 PROTEXIS (WHITE)

## (undated) DEVICE — DRAPE U POLY BLUE 60X72"

## (undated) DEVICE — SAW BLADE STRYKER SAGITTAL DUAL CUT TEETH 35X64X.64MM

## (undated) DEVICE — STAPLER SKIN PROXIMATE

## (undated) DEVICE — GLV 8.5 PROTEXIS (WHITE)

## (undated) DEVICE — STRYKER 4-PORT MANIFOLD W/SPECIMEN COLLECTION

## (undated) DEVICE — WARMING BLANKET UPPER ADULT

## (undated) DEVICE — SUT VICRYL 0 36" CT-1 UNDYED

## (undated) DEVICE — DRSG ACE BANDAGE 6"

## (undated) DEVICE — SUT VICRYL PLUS 2-0 27" CT-1 UNDYED

## (undated) DEVICE — SUT STRATAFIX SPIRAL MONOCRYL PLUS 3-0 30CM PS-1 UNDYED

## (undated) DEVICE — POSITIONER FOAM EGG CRATE ULNAR 2PCS (PINK)

## (undated) DEVICE — WOUND IRR IRRISEPT W 0.5 CHG

## (undated) DEVICE — SUT ETHILON 3-0 18" PS-1